# Patient Record
Sex: FEMALE | Race: OTHER | HISPANIC OR LATINO | Employment: FULL TIME | URBAN - METROPOLITAN AREA
[De-identification: names, ages, dates, MRNs, and addresses within clinical notes are randomized per-mention and may not be internally consistent; named-entity substitution may affect disease eponyms.]

---

## 2019-05-24 ENCOUNTER — HOSPITAL ENCOUNTER (EMERGENCY)
Facility: HOSPITAL | Age: 24
Discharge: HOME | End: 2019-05-24
Attending: EMERGENCY MEDICINE
Payer: COMMERCIAL

## 2019-05-24 VITALS
DIASTOLIC BLOOD PRESSURE: 68 MMHG | SYSTOLIC BLOOD PRESSURE: 145 MMHG | TEMPERATURE: 98.1 F | RESPIRATION RATE: 16 BRPM | WEIGHT: 143.08 LBS | HEART RATE: 79 BPM | OXYGEN SATURATION: 100 %

## 2019-05-24 DIAGNOSIS — R42 DIZZINESS: ICD-10-CM

## 2019-05-24 DIAGNOSIS — N64.4 BREAST PAIN IN FEMALE: Primary | ICD-10-CM

## 2019-05-24 LAB
BACTERIA UR QL AUTO: ABNORMAL /HPF
BILIRUB UR QL STRIP: NEGATIVE
CLARITY UR: ABNORMAL
COLOR UR: YELLOW
EXT PREG TEST URINE: NEGATIVE
GLUCOSE UR STRIP-MCNC: NEGATIVE MG/DL
HGB UR QL STRIP.AUTO: NEGATIVE
KETONES UR STRIP-MCNC: NEGATIVE MG/DL
LEUKOCYTE ESTERASE UR QL STRIP: ABNORMAL
NITRITE UR QL STRIP: NEGATIVE
NON-SQ EPI CELLS URNS QL MICRO: ABNORMAL /HPF
PH UR STRIP.AUTO: 7 [PH] (ref 4.5–8)
PROT UR STRIP-MCNC: NEGATIVE MG/DL
RBC #/AREA URNS AUTO: ABNORMAL /HPF
SP GR UR STRIP.AUTO: 1.02 (ref 1–1.03)
UROBILINOGEN UR QL STRIP.AUTO: 0.2 E.U./DL
WBC #/AREA URNS AUTO: ABNORMAL /HPF

## 2019-05-24 PROCEDURE — 99283 EMERGENCY DEPT VISIT LOW MDM: CPT

## 2019-05-24 PROCEDURE — 81025 URINE PREGNANCY TEST: CPT | Performed by: EMERGENCY MEDICINE

## 2019-05-24 PROCEDURE — 99283 EMERGENCY DEPT VISIT LOW MDM: CPT | Performed by: EMERGENCY MEDICINE

## 2019-05-24 PROCEDURE — 93005 ELECTROCARDIOGRAM TRACING: CPT

## 2019-05-24 PROCEDURE — 81001 URINALYSIS AUTO W/SCOPE: CPT

## 2019-05-24 RX ORDER — ACETAMINOPHEN 325 MG/1
650 TABLET ORAL ONCE
Status: COMPLETED | OUTPATIENT
Start: 2019-05-24 | End: 2019-05-24

## 2019-05-24 RX ADMIN — ACETAMINOPHEN 650 MG: 325 TABLET ORAL at 23:25

## 2019-05-25 LAB
ATRIAL RATE: 80 BPM
P AXIS: 39 DEGREES
PR INTERVAL: 134 MS
QRS AXIS: 83 DEGREES
QRSD INTERVAL: 98 MS
QT INTERVAL: 382 MS
QTC INTERVAL: 440 MS
T WAVE AXIS: 45 DEGREES
VENTRICULAR RATE: 80 BPM

## 2019-05-25 PROCEDURE — 93010 ELECTROCARDIOGRAM REPORT: CPT | Performed by: INTERNAL MEDICINE

## 2019-07-11 ENCOUNTER — ANNUAL EXAM (OUTPATIENT)
Dept: OBGYN CLINIC | Facility: CLINIC | Age: 24
End: 2019-07-11

## 2019-07-11 VITALS
SYSTOLIC BLOOD PRESSURE: 110 MMHG | HEIGHT: 63 IN | BODY MASS INDEX: 25.83 KG/M2 | DIASTOLIC BLOOD PRESSURE: 70 MMHG | WEIGHT: 145.8 LBS

## 2019-07-11 DIAGNOSIS — Z12.4 SCREENING FOR CERVICAL CANCER: Primary | ICD-10-CM

## 2019-07-11 DIAGNOSIS — Z12.39 SCREENING FOR BREAST CANCER: ICD-10-CM

## 2019-07-11 DIAGNOSIS — Z01.419 ENCOUNTER FOR GYNECOLOGICAL EXAMINATION WITHOUT ABNORMAL FINDING: ICD-10-CM

## 2019-07-11 DIAGNOSIS — Z11.3 SCREENING EXAMINATION FOR SEXUALLY TRANSMITTED DISEASE: ICD-10-CM

## 2019-07-11 PROCEDURE — 87491 CHLMYD TRACH DNA AMP PROBE: CPT | Performed by: NURSE PRACTITIONER

## 2019-07-11 PROCEDURE — G0145 SCR C/V CYTO,THINLAYER,RESCR: HCPCS | Performed by: NURSE PRACTITIONER

## 2019-07-11 PROCEDURE — 99385 PREV VISIT NEW AGE 18-39: CPT | Performed by: NURSE PRACTITIONER

## 2019-07-11 PROCEDURE — 87591 N.GONORRHOEAE DNA AMP PROB: CPT | Performed by: NURSE PRACTITIONER

## 2019-07-11 NOTE — PROGRESS NOTES
Kalen Pond is a 26 y o  female who presents today for annual GYN exam   She has never had a pap smear performed previously  She reports menses as regular  Patient's last menstrual period was 2019 (exact date)  Her contraceptive method is nothing currently  Her general medical history has been reviewed and she reports it as follows:    Past Medical History:   Diagnosis Date    Asthma     in early childhood    Prediabetes 2019    started tx with Metformin 2019     Past Surgical History:   Procedure Laterality Date    NO PAST SURGERIES       OB History        0    Para   0    Term   0       0    AB   0    Living   0       SAB   0    TAB   0    Ectopic   0    Multiple   0    Live Births   0               Social History     Tobacco Use    Smoking status: Never Smoker    Smokeless tobacco: Never Used   Substance Use Topics    Alcohol use: Yes     Frequency: Monthly or less     Drinks per session: 1 or 2    Drug use: Never     Cancer-related family history is negative for Breast cancer and Cancer  Current Outpatient Medications:     metFORMIN (GLUCOPHAGE) 500 mg tablet, Take 500 mg by mouth daily with breakfast, Disp: , Rfl:     Review of Systems:  Review of Systems   Constitutional: Negative  Gastrointestinal: Negative  Genitourinary: Negative for difficulty urinating, menstrual problem, pelvic pain and vaginal discharge  Skin: Negative  Physical Exam:  /70   Ht 5' 3" (1 6 m)   Wt 66 1 kg (145 lb 12 8 oz)   LMP 2019 (Exact Date)   BMI 25 83 kg/m²   Physical Exam   Constitutional: She is oriented to person, place, and time  She appears well-developed and well-nourished  Genitourinary: Vagina normal and uterus normal  There is no lesion on the right labia  There is no lesion on the left labia  Vagina exhibits no lesion and no rugosity  No vaginal discharge found   Right adnexum does not display mass and does not display tenderness  Left adnexum does not display mass and does not display tenderness  Cervix does not exhibit motion tenderness, lesion or pinkness  Uterus is not tender  Neck: Neck supple  No thyromegaly present  Cardiovascular: Normal rate and regular rhythm  Pulmonary/Chest: Effort normal and breath sounds normal  Right breast exhibits no mass, no nipple discharge, no skin change and no tenderness  Left breast exhibits no mass, no nipple discharge, no skin change and no tenderness  Abdominal: Soft  Bowel sounds are normal    Neurological: She is alert and oriented to person, place, and time  Skin: Skin is warm and dry  Assessment/Plan:   1  Normal well-woman GYN exam   2  Cervical cancer screening:  Pap smear done with HPV reflex  3  STD screening:  Orders placed for vaginal GC/CT cultures  4  Breast cancer screening:  Normal breast exam    5  Depression Screening: Patient's depression screening was positive with a PHQ-2 score of 0  No intervention indicated  6  BMI Counseling: Body mass index is 25 83 kg/m²  No intervention indicated at this time     7  Return to office in 1 year for annual GYN exam

## 2019-07-11 NOTE — LETTER
7/15/2019    To Valentine Alexander  : 1995      This letter is to advise you that your recent PAP SMEAR and CULTURE results were reviewed by me and are NORMAL    We will see you in 1 year for your annual exam     VIRAJ Esquivel

## 2019-07-12 LAB
C TRACH DNA SPEC QL NAA+PROBE: NEGATIVE
N GONORRHOEA DNA SPEC QL NAA+PROBE: NEGATIVE

## 2019-07-15 LAB
LAB AP GYN PRIMARY INTERPRETATION: NORMAL
Lab: NORMAL
PATH INTERP SPEC-IMP: NORMAL

## 2019-09-10 ENCOUNTER — HOSPITAL ENCOUNTER (EMERGENCY)
Facility: HOSPITAL | Age: 24
Discharge: HOME | End: 2019-09-10
Attending: EMERGENCY MEDICINE | Admitting: EMERGENCY MEDICINE
Payer: COMMERCIAL

## 2019-09-10 ENCOUNTER — APPOINTMENT (EMERGENCY)
Dept: ULTRASOUND IMAGING | Facility: HOSPITAL | Age: 24
End: 2019-09-10
Payer: COMMERCIAL

## 2019-09-10 VITALS
WEIGHT: 144.62 LBS | BODY MASS INDEX: 25.62 KG/M2 | HEART RATE: 87 BPM | RESPIRATION RATE: 18 BRPM | SYSTOLIC BLOOD PRESSURE: 107 MMHG | TEMPERATURE: 99 F | DIASTOLIC BLOOD PRESSURE: 64 MMHG | OXYGEN SATURATION: 100 %

## 2019-09-10 DIAGNOSIS — R10.9 ABDOMINAL PAIN: Primary | ICD-10-CM

## 2019-09-10 DIAGNOSIS — N39.0 UTI (URINARY TRACT INFECTION): ICD-10-CM

## 2019-09-10 DIAGNOSIS — N94.9 ADNEXAL CYST: ICD-10-CM

## 2019-09-10 LAB
ANION GAP SERPL CALCULATED.3IONS-SCNC: 9 MMOL/L (ref 4–13)
BACTERIA UR QL AUTO: ABNORMAL /HPF
BASOPHILS # BLD AUTO: 0.02 THOUSANDS/ΜL (ref 0–0.1)
BASOPHILS NFR BLD AUTO: 0 % (ref 0–1)
BILIRUB UR QL STRIP: NEGATIVE
BUN SERPL-MCNC: 11 MG/DL (ref 5–25)
CALCIUM SERPL-MCNC: 8.9 MG/DL (ref 8.3–10.1)
CHLORIDE SERPL-SCNC: 103 MMOL/L (ref 100–108)
CLARITY UR: CLEAR
CO2 SERPL-SCNC: 26 MMOL/L (ref 21–32)
COLOR UR: YELLOW
CREAT SERPL-MCNC: 0.77 MG/DL (ref 0.6–1.3)
EOSINOPHIL # BLD AUTO: 0.09 THOUSAND/ΜL (ref 0–0.61)
EOSINOPHIL NFR BLD AUTO: 1 % (ref 0–6)
ERYTHROCYTE [DISTWIDTH] IN BLOOD BY AUTOMATED COUNT: 13.2 % (ref 11.6–15.1)
EXT PREG TEST URINE: NEGATIVE
EXT. CONTROL ED NAV: NORMAL
GFR SERPL CREATININE-BSD FRML MDRD: 110 ML/MIN/1.73SQ M
GLUCOSE SERPL-MCNC: 100 MG/DL (ref 65–140)
GLUCOSE UR STRIP-MCNC: NEGATIVE MG/DL
HCT VFR BLD AUTO: 30.6 % (ref 34.8–46.1)
HGB BLD-MCNC: 9.8 G/DL (ref 11.5–15.4)
HGB UR QL STRIP.AUTO: NEGATIVE
IMM GRANULOCYTES # BLD AUTO: 0.05 THOUSAND/UL (ref 0–0.2)
IMM GRANULOCYTES NFR BLD AUTO: 1 % (ref 0–2)
KETONES UR STRIP-MCNC: NEGATIVE MG/DL
LEUKOCYTE ESTERASE UR QL STRIP: ABNORMAL
LYMPHOCYTES # BLD AUTO: 3.04 THOUSANDS/ΜL (ref 0.6–4.47)
LYMPHOCYTES NFR BLD AUTO: 28 % (ref 14–44)
MCH RBC QN AUTO: 27.1 PG (ref 26.8–34.3)
MCHC RBC AUTO-ENTMCNC: 32 G/DL (ref 31.4–37.4)
MCV RBC AUTO: 85 FL (ref 82–98)
MONOCYTES # BLD AUTO: 0.66 THOUSAND/ΜL (ref 0.17–1.22)
MONOCYTES NFR BLD AUTO: 6 % (ref 4–12)
NEUTROPHILS # BLD AUTO: 6.92 THOUSANDS/ΜL (ref 1.85–7.62)
NEUTS SEG NFR BLD AUTO: 64 % (ref 43–75)
NITRITE UR QL STRIP: NEGATIVE
NON-SQ EPI CELLS URNS QL MICRO: ABNORMAL /HPF
NRBC BLD AUTO-RTO: 0 /100 WBCS
PH UR STRIP.AUTO: 7.5 [PH] (ref 4.5–8)
PLATELET # BLD AUTO: 255 THOUSANDS/UL (ref 149–390)
PMV BLD AUTO: 9.8 FL (ref 8.9–12.7)
POTASSIUM SERPL-SCNC: 3.2 MMOL/L (ref 3.5–5.3)
PROT UR STRIP-MCNC: NEGATIVE MG/DL
RBC # BLD AUTO: 3.62 MILLION/UL (ref 3.81–5.12)
RBC #/AREA URNS AUTO: ABNORMAL /HPF
SODIUM SERPL-SCNC: 138 MMOL/L (ref 136–145)
SP GR UR STRIP.AUTO: 1.02 (ref 1–1.03)
UROBILINOGEN UR QL STRIP.AUTO: 0.2 E.U./DL
WBC # BLD AUTO: 10.78 THOUSAND/UL (ref 4.31–10.16)
WBC #/AREA URNS AUTO: ABNORMAL /HPF

## 2019-09-10 PROCEDURE — 81025 URINE PREGNANCY TEST: CPT | Performed by: EMERGENCY MEDICINE

## 2019-09-10 PROCEDURE — 99284 EMERGENCY DEPT VISIT MOD MDM: CPT | Performed by: EMERGENCY MEDICINE

## 2019-09-10 PROCEDURE — 36415 COLL VENOUS BLD VENIPUNCTURE: CPT | Performed by: EMERGENCY MEDICINE

## 2019-09-10 PROCEDURE — 80048 BASIC METABOLIC PNL TOTAL CA: CPT | Performed by: EMERGENCY MEDICINE

## 2019-09-10 PROCEDURE — 76830 TRANSVAGINAL US NON-OB: CPT

## 2019-09-10 PROCEDURE — 76856 US EXAM PELVIC COMPLETE: CPT

## 2019-09-10 PROCEDURE — 99284 EMERGENCY DEPT VISIT MOD MDM: CPT

## 2019-09-10 PROCEDURE — 81001 URINALYSIS AUTO W/SCOPE: CPT

## 2019-09-10 PROCEDURE — 85025 COMPLETE CBC W/AUTO DIFF WBC: CPT | Performed by: EMERGENCY MEDICINE

## 2019-09-10 RX ORDER — NITROFURANTOIN 25; 75 MG/1; MG/1
100 CAPSULE ORAL 2 TIMES DAILY
Qty: 10 CAPSULE | Refills: 0 | Status: SHIPPED | OUTPATIENT
Start: 2019-09-10 | End: 2020-05-13

## 2019-09-10 NOTE — DISCHARGE INSTRUCTIONS
FINDINGS:     UTERUS:  The uterus is anteverted in position, measuring 7 7 x 4 6 x 5 7 cm  Contour and echotexture appear normal   The cervix shows no suspicious abnormality  ENDOMETRIUM:    Normal caliber of 16 mm  Homogeneous and normal in appearance  OVARIES/ADNEXA:  Right ovary is normal size, 10 cc  No suspicious right ovarian abnormality  Doppler flow within normal limits  Left ovary is slightly enlarged, 23 cc  Left ovarian 4 4 x 1 9 x 4 2 cm simple cyst   Doppler flow within normal limits  Large right adnexal 8 1 x 7 2 x 6 1 cm cyst which appears simple adjacent to the right ovary  There is small amount of simple free fluid in the pelvis, likely physiologic in this premenopausal female  IMPRESSION:        Large right adnexal/paraovarian 8 1 x 7 2 x 6 1 cm simple cyst (previously 6 2 x 6 5 x 4 5 cm) which appears simple adjacent to the right ovary  Follow-up nonemergent outpatient contrast enhanced pelvic MRI recommended

## 2019-09-10 NOTE — ED PROVIDER NOTES
History  Chief Complaint   Patient presents with    Abdominal Pain      used - Reports pelvic and abdominal pain x2 days  Denies n/v or urinary sx  Subjective fevers at home  This is an otherwise healthy 53-year-old female who presents with abdominal pain  The patient is Bulgarian-speaking only so the  line was used  Over the past 2 days, the patient has been experiencing bilateral "ovary pain"  The patient states that she has a history of cysts and "they are bothering me"  She describes the pain as a constant pressure in the bilateral pelvic area, nonradiating, associated with dysuria  She has tried taking Motrin without relief  Denies any exacerbating factors  The patient is sexually active and uses protection 100% of the time  Denies any history of STDs  Denies any history of abdominal surgeries  No history of gallstones, kidney stones, alcohol abuse  Denies fever/chills, nausea/vomiting, lightheadedness/dizziness, numbness/weakness, headache, change in vision, URI symptoms, neck pain, chest pain, palpitations, shortness of breath, cough, back pain, flank pain, abdominal pain, diarrhea, hematochezia, melena, hematuria, abnormal vaginal discharge/bleeding  5:18 PM  I reviewed all testing with the patient using the  phone      I have reviewed all incidental findings including the results of her transvaginal US:  "Large right adnexal/paraovarian 8 1 x 7 2 x 6 1 cm simple cyst (previously 6 2 x 6 5 x 4 5 cm) which appears simple adjacent to the right ovary  Follow-up nonemergent outpatient contrast enhanced pelvic MRI recommended ", as well her new onset anemia  Patient instructed to follow up with primary care doctor regarding these findings  I gave oral return precautions for what to return for in addition to the written return precautions     The patient (and any family present) verbalized understanding of the discharge instructions and warnings that would necessitate return to the Emergency Department  I specifically highlighted areas of special concern regarding the written and verbal discharge instructions and return precautions  All questions were answered prior to discharge  Patient is asymptomatic as far as the anemia  Denies any recent bleeding or menstrual bleeding  The patient states that she does have a family doctor that she follows with  States that she will call her family doctor for follow-up  Prior to Admission Medications   Prescriptions Last Dose Informant Patient Reported? Taking?   metFORMIN (GLUCOPHAGE) 500 mg tablet  Self Yes No   Sig: Take 500 mg by mouth daily with breakfast      Facility-Administered Medications: None       Past Medical History:   Diagnosis Date    Asthma     in early childhood    Prediabetes 2019    started tx with Metformin 7/2019       Past Surgical History:   Procedure Laterality Date    NO PAST SURGERIES         Family History   Problem Relation Age of Onset    Breast cancer Neg Hx     Cancer Neg Hx      I have reviewed and agree with the history as documented  Social History     Tobacco Use    Smoking status: Never Smoker    Smokeless tobacco: Never Used   Substance Use Topics    Alcohol use: Yes     Frequency: Monthly or less     Drinks per session: 1 or 2    Drug use: Never        Review of Systems   Constitutional: Negative for chills and fever  HENT: Negative for rhinorrhea, sore throat and trouble swallowing  Eyes: Negative for photophobia and visual disturbance  Respiratory: Negative for cough, chest tightness and shortness of breath  Cardiovascular: Negative for chest pain, palpitations and leg swelling  Gastrointestinal: Negative for abdominal pain, blood in stool, diarrhea, nausea and vomiting  Endocrine: Negative for polyuria  Genitourinary: Positive for dysuria and pelvic pain  Negative for flank pain, hematuria, vaginal bleeding and vaginal discharge  Musculoskeletal: Negative for back pain and neck pain  Skin: Negative for color change and rash  Allergic/Immunologic: Negative for immunocompromised state  Neurological: Negative for dizziness, weakness, light-headedness, numbness and headaches  All other systems reviewed and are negative  Physical Exam  Physical Exam   Constitutional: Vital signs are normal  She appears well-developed  She is cooperative  No distress  HENT:   Mouth/Throat: Uvula is midline, oropharynx is clear and moist and mucous membranes are normal    Eyes: Pupils are equal, round, and reactive to light  Conjunctivae and EOM are normal    Neck: Trachea normal  No thyroid mass and no thyromegaly present  Cardiovascular: Normal rate, regular rhythm, normal heart sounds, intact distal pulses and normal pulses  No murmur heard  Pulmonary/Chest: Effort normal and breath sounds normal    Abdominal: Soft  Normal appearance and bowel sounds are normal  There is tenderness in the right lower quadrant and left lower quadrant  There is no rebound, no guarding and no CVA tenderness  The patient has bilateral tenderness within the pelvis  No rebound or guarding  Neurological: She is alert  Skin: Skin is warm, dry and intact  Psychiatric: She has a normal mood and affect   Her speech is normal and behavior is normal  Thought content normal        Vital Signs  ED Triage Vitals   Temperature Pulse Respirations Blood Pressure SpO2   09/10/19 1443 09/10/19 1443 09/10/19 1443 09/10/19 1443 09/10/19 1443   99 °F (37 2 °C) 86 20 123/65 100 %      Temp Source Heart Rate Source Patient Position - Orthostatic VS BP Location FiO2 (%)   09/10/19 1443 09/10/19 1443 09/10/19 1443 09/10/19 1443 --   Oral Monitor Sitting Right arm       Pain Score       09/10/19 1650       3           Vitals:    09/10/19 1443 09/10/19 1650   BP: 123/65 107/64   Pulse: 86 87   Patient Position - Orthostatic VS: Sitting Sitting         Visual Acuity      ED Medications  Medications - No data to display    Diagnostic Studies  Results Reviewed     Procedure Component Value Units Date/Time    Urine Microscopic [[id number]]  (Abnormal) Collected:  09/10/19 1508    Lab Status:  Final result Specimen:  Urine, Clean Catch Updated:  09/10/19 1538     RBC, UA None Seen /hpf      WBC, UA 2-4 /hpf      Epithelial Cells Moderate /hpf      Bacteria, UA Occasional /hpf     Basic metabolic panel [[id number]]  (Abnormal) Collected:  09/10/19 1518    Lab Status:  Final result Specimen:  Blood from Arm, Right Updated:  09/10/19 1533     Sodium 138 mmol/L      Potassium 3 2 mmol/L      Chloride 103 mmol/L      CO2 26 mmol/L      ANION GAP 9 mmol/L      BUN 11 mg/dL      Creatinine 0 77 mg/dL      Glucose 100 mg/dL      Calcium 8 9 mg/dL      eGFR 110 ml/min/1 73sq m     Narrative:       [place] guidelines for Chronic Kidney Disease (CKD):     Stage 1 with normal or high GFR (GFR > 90 mL/min/1 73 square meters)    Stage 2 Mild CKD (GFR = 60-89 mL/min/1 73 square meters)    Stage 3A Moderate CKD (GFR = 45-59 mL/min/1 73 square meters)    Stage 3B Moderate CKD (GFR = 30-44 mL/min/1 73 square meters)    Stage 4 Severe CKD (GFR = 15-29 mL/min/1 73 square meters)    Stage 5 End Stage CKD (GFR <15 mL/min/1 73 square meters)  Note: GFR calculation is accurate only with a steady state creatinine    CBC and differential [[id number]]  (Abnormal) Collected:  09/10/19 1518    Lab Status:  Final result Specimen:  Blood from Arm, Right Updated:  09/10/19 1524     WBC 10 78 Thousand/uL      RBC 3 62 Million/uL      Hemoglobin 9 8 g/dL      Hematocrit 30 6 %      MCV 85 fL      MCH 27 1 pg      MCHC 32 0 g/dL      RDW 13 2 %      MPV 9 8 fL      Platelets 686 Thousands/uL      nRBC 0 /100 WBCs      Neutrophils Relative 64 %      Immat GRANS % 1 %      Lymphocytes Relative 28 %      Monocytes Relative 6 %      Eosinophils Relative 1 %      Basophils Relative 0 % Neutrophils Absolute 6 92 Thousands/µL      Immature Grans Absolute 0 05 Thousand/uL      Lymphocytes Absolute 3 04 Thousands/µL      Monocytes Absolute 0 66 Thousand/µL      Eosinophils Absolute 0 09 Thousand/µL      Basophils Absolute 0 02 Thousands/µL     POCT pregnancy, urine [[id number]]  (Normal) Resulted:  09/10/19 1510    Lab Status:  Final result Updated:  09/10/19 1510     EXT PREG TEST UR (Ref: Negative) Negative     Control Valid    POCT urinalysis dipstick [[id number]]  (Abnormal) Resulted:  09/10/19 1510    Lab Status:  Final result Specimen:  Urine Updated:  09/10/19 1510    ED Urine Macroscopic [[id number]]  (Abnormal) Collected:  09/10/19 1508    Lab Status:  Final result Specimen:  Urine Updated:  09/10/19 1509     Color, UA Yellow     Clarity, UA Clear     pH, UA 7 5     Leukocytes, UA Trace     Nitrite, UA Negative     Protein, UA Negative mg/dl      Glucose, UA Negative mg/dl      Ketones, UA Negative mg/dl      Urobilinogen, UA 0 2 E U /dl      Bilirubin, UA Negative     Blood, UA Negative     Specific Gravity, UA 1 020    Narrative:       CLINITEK RESULT                 US pelvis complete w transvaginal   Final Result by Juani Kline MD (09/10 1659)          Large right adnexal/paraovarian 8 1 x 7 2 x 6 1 cm simple cyst (previously 6 2 x 6 5 x 4 5 cm) which appears simple adjacent to the right ovary  Follow-up nonemergent outpatient contrast enhanced pelvic MRI recommended  Workstation performed: [id number]                    Procedures  Procedures       ED Course  ED Course as of Sep 10 1725   Tue Sep 10, 2019   1525 Hemoglobin(!): 9 8                               MDM  Number of Diagnoses or Management Options  Diagnosis management comments: Likely ovarian cysts verses UTI versus torsion  Will check basic lab work with urine pregnancy and urinalysis  Pelvic ultrasound  Disposition pending results        Disposition  Final diagnoses:   Abdominal pain   Adnexal cyst   UTI (urinary tract infection)     Time reflects when diagnosis was documented in both MDM as applicable and the Disposition within this note     Time User Action Codes Description Comment    9/10/2019  5:21 PM Tone Pennington Add [R10 9] Abdominal pain     9/10/2019  5:21 PM Tone Pennington Add [N94 9] Adnexal cyst     9/10/2019  5:22 PM Wilner HINES Add [N39 0] UTI (urinary tract infection)       ED Disposition     ED Disposition Condition Date/Time Comment    Discharge Stable Tue Sep 10, 2019  5:21 PM Leyva Yobani discharge to home/self care  Follow-up Information     Follow up With Specialties Details Why Contact Info Additional Information    [facility] Emergency Medicine Go to   [facility] [address] [phone] [place] [facility], [address] [place], [place], [address] [place] [address] doctor de tom  Schedule an appointment as soon as possible for a visit              Patient's Medications   Discharge Prescriptions    NITROFURANTOIN (MACROBID) 100 MG CAPSULE    Take 1 capsule (100 mg total) by mouth 2 (two) times a day       Start Date: 9/10/2019 End Date: --       Order Dose: 100 mg       Quantity: 10 capsule    Refills: 0     No discharge procedures on file      ED Provider  Electronically Signed by           Darryle Parkins, MD  09/10/19 6099

## 2020-01-14 LAB — EXTERNAL HIV SCREEN: NORMAL

## 2020-05-13 ENCOUNTER — APPOINTMENT (EMERGENCY)
Dept: RADIOLOGY | Facility: HOSPITAL | Age: 25
End: 2020-05-13
Payer: COMMERCIAL

## 2020-05-13 ENCOUNTER — HOSPITAL ENCOUNTER (EMERGENCY)
Facility: HOSPITAL | Age: 25
Discharge: HOME | End: 2020-05-13
Attending: EMERGENCY MEDICINE | Admitting: EMERGENCY MEDICINE
Payer: COMMERCIAL

## 2020-05-13 VITALS
DIASTOLIC BLOOD PRESSURE: 70 MMHG | BODY MASS INDEX: 25.77 KG/M2 | HEART RATE: 75 BPM | OXYGEN SATURATION: 98 % | TEMPERATURE: 98.4 F | SYSTOLIC BLOOD PRESSURE: 121 MMHG | RESPIRATION RATE: 16 BRPM | WEIGHT: 145.5 LBS

## 2020-05-13 DIAGNOSIS — R05.9 COUGH: Primary | ICD-10-CM

## 2020-05-13 DIAGNOSIS — R06.00 DYSPNEA: ICD-10-CM

## 2020-05-13 LAB
ATRIAL RATE: 91 BPM
P AXIS: 42 DEGREES
PR INTERVAL: 132 MS
QRS AXIS: 80 DEGREES
QRSD INTERVAL: 92 MS
QT INTERVAL: 334 MS
QTC INTERVAL: 410 MS
T WAVE AXIS: 44 DEGREES
VENTRICULAR RATE: 91 BPM

## 2020-05-13 PROCEDURE — 93010 ELECTROCARDIOGRAM REPORT: CPT | Performed by: INTERNAL MEDICINE

## 2020-05-13 PROCEDURE — 99283 EMERGENCY DEPT VISIT LOW MDM: CPT

## 2020-05-13 PROCEDURE — 71045 X-RAY EXAM CHEST 1 VIEW: CPT

## 2020-05-13 PROCEDURE — 99285 EMERGENCY DEPT VISIT HI MDM: CPT | Performed by: EMERGENCY MEDICINE

## 2020-05-13 PROCEDURE — 94640 AIRWAY INHALATION TREATMENT: CPT

## 2020-05-13 PROCEDURE — 93005 ELECTROCARDIOGRAM TRACING: CPT

## 2020-05-13 RX ORDER — ACETAMINOPHEN 325 MG/1
650 TABLET ORAL EVERY 6 HOURS PRN
COMMUNITY

## 2020-05-13 RX ORDER — AZITHROMYCIN 250 MG/1
TABLET, FILM COATED ORAL
Qty: 6 TABLET | Refills: 0 | Status: SHIPPED | OUTPATIENT
Start: 2020-05-13 | End: 2020-05-17

## 2020-05-13 RX ORDER — PREDNISONE 20 MG/1
40 TABLET ORAL DAILY
Qty: 10 TABLET | Refills: 0 | Status: SHIPPED | OUTPATIENT
Start: 2020-05-13 | End: 2020-05-18

## 2020-05-13 RX ADMIN — IPRATROPIUM BROMIDE 0.5 MG: 0.5 SOLUTION RESPIRATORY (INHALATION) at 12:43

## 2020-05-13 RX ADMIN — ALBUTEROL SULFATE 5 MG: 2.5 SOLUTION RESPIRATORY (INHALATION) at 12:43

## 2020-05-14 ENCOUNTER — TELEPHONE (OUTPATIENT)
Dept: ADMINISTRATIVE | Facility: OTHER | Age: 25
End: 2020-05-14

## 2020-05-14 RX ORDER — ALBUTEROL SULFATE 90 UG/1
1 AEROSOL, METERED RESPIRATORY (INHALATION) EVERY 6 HOURS PRN
COMMUNITY
Start: 2020-04-07 | End: 2021-04-07

## 2020-05-14 RX ORDER — LEVONORGESTREL AND ETHINYL ESTRADIOL 0.1-0.02MG
20 KIT ORAL DAILY
COMMUNITY
Start: 2020-04-20 | End: 2021-09-03

## 2020-05-15 ENCOUNTER — TELEMEDICINE (OUTPATIENT)
Dept: FAMILY MEDICINE CLINIC | Facility: CLINIC | Age: 25
End: 2020-05-15
Payer: COMMERCIAL

## 2020-05-15 DIAGNOSIS — B97.21 SARS-ASSOCIATED CORONAVIRUS INFECTION: Primary | ICD-10-CM

## 2020-05-15 PROCEDURE — 99213 OFFICE O/P EST LOW 20 MIN: CPT | Performed by: NURSE PRACTITIONER

## 2020-05-18 ENCOUNTER — TELEMEDICINE (OUTPATIENT)
Dept: FAMILY MEDICINE CLINIC | Facility: CLINIC | Age: 25
End: 2020-05-18
Payer: COMMERCIAL

## 2020-05-18 DIAGNOSIS — B97.21 SARS-ASSOCIATED CORONAVIRUS INFECTION: ICD-10-CM

## 2020-05-18 DIAGNOSIS — H10.13 ALLERGIC CONJUNCTIVITIS OF BOTH EYES: Primary | ICD-10-CM

## 2020-05-18 PROCEDURE — 99213 OFFICE O/P EST LOW 20 MIN: CPT | Performed by: NURSE PRACTITIONER

## 2020-05-18 RX ORDER — AZELASTINE HYDROCHLORIDE 0.5 MG/ML
1 SOLUTION/ DROPS OPHTHALMIC 2 TIMES DAILY
Qty: 6 ML | Refills: 0 | Status: SHIPPED | OUTPATIENT
Start: 2020-05-18 | End: 2021-09-15

## 2020-05-19 ENCOUNTER — TELEPHONE (OUTPATIENT)
Dept: PULMONOLOGY | Facility: CLINIC | Age: 25
End: 2020-05-19

## 2020-05-19 ENCOUNTER — OFFICE VISIT (OUTPATIENT)
Dept: INTERNAL MEDICINE CLINIC | Facility: CLINIC | Age: 25
End: 2020-05-19
Payer: COMMERCIAL

## 2020-05-19 VITALS — HEART RATE: 132 BPM | TEMPERATURE: 96.1 F | OXYGEN SATURATION: 99 %

## 2020-05-19 DIAGNOSIS — B97.21 SARS-ASSOCIATED CORONAVIRUS INFECTION: Primary | ICD-10-CM

## 2020-05-19 PROCEDURE — 1036F TOBACCO NON-USER: CPT | Performed by: NURSE PRACTITIONER

## 2020-05-19 PROCEDURE — 99214 OFFICE O/P EST MOD 30 MIN: CPT | Performed by: NURSE PRACTITIONER

## 2020-05-20 ENCOUNTER — TELEPHONE (OUTPATIENT)
Dept: FAMILY MEDICINE CLINIC | Facility: CLINIC | Age: 25
End: 2020-05-20

## 2020-05-21 ENCOUNTER — TELEMEDICINE (OUTPATIENT)
Dept: FAMILY MEDICINE CLINIC | Facility: CLINIC | Age: 25
End: 2020-05-21
Payer: COMMERCIAL

## 2020-05-21 DIAGNOSIS — J45.40 MODERATE PERSISTENT ASTHMA WITHOUT COMPLICATION: Primary | ICD-10-CM

## 2020-05-21 PROCEDURE — 99213 OFFICE O/P EST LOW 20 MIN: CPT | Performed by: NURSE PRACTITIONER

## 2020-05-21 RX ORDER — FLUTICASONE PROPIONATE 44 UG/1
2 AEROSOL, METERED RESPIRATORY (INHALATION) 2 TIMES DAILY
Qty: 1 INHALER | Refills: 3 | Status: SHIPPED | OUTPATIENT
Start: 2020-05-21 | End: 2020-05-27 | Stop reason: ALTCHOICE

## 2020-05-22 ENCOUNTER — TELEPHONE (OUTPATIENT)
Dept: FAMILY MEDICINE CLINIC | Facility: CLINIC | Age: 25
End: 2020-05-22

## 2020-05-26 ENCOUNTER — TELEMEDICINE (OUTPATIENT)
Dept: FAMILY MEDICINE CLINIC | Facility: CLINIC | Age: 25
End: 2020-05-26
Payer: COMMERCIAL

## 2020-05-26 DIAGNOSIS — B97.21 SARS-ASSOCIATED CORONAVIRUS INFECTION: Primary | ICD-10-CM

## 2020-05-26 PROCEDURE — 99213 OFFICE O/P EST LOW 20 MIN: CPT | Performed by: NURSE PRACTITIONER

## 2020-05-27 ENCOUNTER — DOCUMENTATION (OUTPATIENT)
Dept: PULMONOLOGY | Facility: CLINIC | Age: 25
End: 2020-05-27

## 2020-05-27 ENCOUNTER — TELEMEDICINE (OUTPATIENT)
Dept: PULMONOLOGY | Facility: CLINIC | Age: 25
End: 2020-05-27
Payer: COMMERCIAL

## 2020-05-27 VITALS — WEIGHT: 145 LBS | HEIGHT: 62 IN | BODY MASS INDEX: 26.68 KG/M2

## 2020-05-27 DIAGNOSIS — J45.40 MODERATE PERSISTENT ASTHMA WITHOUT COMPLICATION: Primary | ICD-10-CM

## 2020-05-27 DIAGNOSIS — B97.21 SARS-ASSOCIATED CORONAVIRUS INFECTION: ICD-10-CM

## 2020-05-27 DIAGNOSIS — J45.909 ASTHMA DUE TO ENVIRONMENTAL ALLERGIES: ICD-10-CM

## 2020-05-27 PROCEDURE — 99203 OFFICE O/P NEW LOW 30 MIN: CPT | Performed by: INTERNAL MEDICINE

## 2020-05-27 RX ORDER — MONTELUKAST SODIUM 10 MG/1
10 TABLET ORAL
Qty: 90 TABLET | Refills: 3 | Status: SHIPPED | OUTPATIENT
Start: 2020-05-27 | End: 2021-08-06 | Stop reason: SDUPTHER

## 2020-05-27 RX ORDER — FLUTICASONE PROPIONATE 110 UG/1
2 AEROSOL, METERED RESPIRATORY (INHALATION) 2 TIMES DAILY
Qty: 1 INHALER | Refills: 3 | Status: SHIPPED | OUTPATIENT
Start: 2020-05-27 | End: 2021-08-06 | Stop reason: SDUPTHER

## 2020-06-02 ENCOUNTER — TELEPHONE (OUTPATIENT)
Dept: FAMILY MEDICINE CLINIC | Facility: CLINIC | Age: 25
End: 2020-06-02

## 2020-06-11 ENCOUNTER — TELEPHONE (OUTPATIENT)
Dept: FAMILY MEDICINE CLINIC | Facility: CLINIC | Age: 25
End: 2020-06-11

## 2020-06-16 ENCOUNTER — TELEPHONE (OUTPATIENT)
Dept: FAMILY MEDICINE CLINIC | Facility: CLINIC | Age: 25
End: 2020-06-16

## 2020-07-06 ENCOUNTER — APPOINTMENT (OUTPATIENT)
Dept: LAB | Facility: CLINIC | Age: 25
End: 2020-07-06
Payer: COMMERCIAL

## 2020-07-06 ENCOUNTER — OFFICE VISIT (OUTPATIENT)
Dept: FAMILY MEDICINE CLINIC | Facility: CLINIC | Age: 25
End: 2020-07-06
Payer: COMMERCIAL

## 2020-07-06 VITALS
BODY MASS INDEX: 27.31 KG/M2 | WEIGHT: 148.4 LBS | SYSTOLIC BLOOD PRESSURE: 115 MMHG | TEMPERATURE: 97.5 F | OXYGEN SATURATION: 97 % | HEART RATE: 93 BPM | DIASTOLIC BLOOD PRESSURE: 70 MMHG | HEIGHT: 62 IN | RESPIRATION RATE: 20 BRPM

## 2020-07-06 DIAGNOSIS — Z00.00 ANNUAL PHYSICAL EXAM: Primary | ICD-10-CM

## 2020-07-06 DIAGNOSIS — Z13.220 SCREENING FOR CHOLESTEROL LEVEL: ICD-10-CM

## 2020-07-06 DIAGNOSIS — R53.83 OTHER FATIGUE: ICD-10-CM

## 2020-07-06 DIAGNOSIS — R73.9 HYPERGLYCEMIA: ICD-10-CM

## 2020-07-06 LAB
ALBUMIN SERPL BCP-MCNC: 4 G/DL (ref 3.5–5)
ALP SERPL-CCNC: 56 U/L (ref 46–116)
ALT SERPL W P-5'-P-CCNC: 71 U/L (ref 12–78)
ANION GAP SERPL CALCULATED.3IONS-SCNC: 6 MMOL/L (ref 4–13)
AST SERPL W P-5'-P-CCNC: 42 U/L (ref 5–45)
BASOPHILS # BLD AUTO: 0.04 THOUSANDS/ΜL (ref 0–0.1)
BASOPHILS NFR BLD AUTO: 1 % (ref 0–1)
BILIRUB SERPL-MCNC: 0.25 MG/DL (ref 0.2–1)
BUN SERPL-MCNC: 16 MG/DL (ref 5–25)
CALCIUM SERPL-MCNC: 8.8 MG/DL (ref 8.3–10.1)
CHLORIDE SERPL-SCNC: 107 MMOL/L (ref 100–108)
CHOLEST SERPL-MCNC: 120 MG/DL (ref 50–200)
CO2 SERPL-SCNC: 26 MMOL/L (ref 21–32)
CREAT SERPL-MCNC: 0.74 MG/DL (ref 0.6–1.3)
EOSINOPHIL # BLD AUTO: 0.15 THOUSAND/ΜL (ref 0–0.61)
EOSINOPHIL NFR BLD AUTO: 2 % (ref 0–6)
ERYTHROCYTE [DISTWIDTH] IN BLOOD BY AUTOMATED COUNT: 12.4 % (ref 11.6–15.1)
EST. AVERAGE GLUCOSE BLD GHB EST-MCNC: 117 MG/DL
GFR SERPL CREATININE-BSD FRML MDRD: 115 ML/MIN/1.73SQ M
GLUCOSE P FAST SERPL-MCNC: 95 MG/DL (ref 65–99)
HBA1C MFR BLD: 5.7 %
HCT VFR BLD AUTO: 37.1 % (ref 34.8–46.1)
HDLC SERPL-MCNC: 31 MG/DL
HGB BLD-MCNC: 12 G/DL (ref 11.5–15.4)
IMM GRANULOCYTES # BLD AUTO: 0.02 THOUSAND/UL (ref 0–0.2)
IMM GRANULOCYTES NFR BLD AUTO: 0 % (ref 0–2)
LDLC SERPL CALC-MCNC: 63 MG/DL (ref 0–100)
LYMPHOCYTES # BLD AUTO: 2.5 THOUSANDS/ΜL (ref 0.6–4.47)
LYMPHOCYTES NFR BLD AUTO: 39 % (ref 14–44)
MCH RBC QN AUTO: 27 PG (ref 26.8–34.3)
MCHC RBC AUTO-ENTMCNC: 32.3 G/DL (ref 31.4–37.4)
MCV RBC AUTO: 83 FL (ref 82–98)
MONOCYTES # BLD AUTO: 0.39 THOUSAND/ΜL (ref 0.17–1.22)
MONOCYTES NFR BLD AUTO: 6 % (ref 4–12)
NEUTROPHILS # BLD AUTO: 3.28 THOUSANDS/ΜL (ref 1.85–7.62)
NEUTS SEG NFR BLD AUTO: 52 % (ref 43–75)
NONHDLC SERPL-MCNC: 89 MG/DL
NRBC BLD AUTO-RTO: 0 /100 WBCS
PLATELET # BLD AUTO: 262 THOUSANDS/UL (ref 149–390)
PMV BLD AUTO: 11.3 FL (ref 8.9–12.7)
POTASSIUM SERPL-SCNC: 3.8 MMOL/L (ref 3.5–5.3)
PROT SERPL-MCNC: 7.5 G/DL (ref 6.4–8.2)
RBC # BLD AUTO: 4.45 MILLION/UL (ref 3.81–5.12)
SODIUM SERPL-SCNC: 139 MMOL/L (ref 136–145)
TRIGL SERPL-MCNC: 128 MG/DL
TSH SERPL DL<=0.05 MIU/L-ACNC: 3.08 UIU/ML (ref 0.36–3.74)
WBC # BLD AUTO: 6.38 THOUSAND/UL (ref 4.31–10.16)

## 2020-07-06 PROCEDURE — 80053 COMPREHEN METABOLIC PANEL: CPT

## 2020-07-06 PROCEDURE — 85025 COMPLETE CBC W/AUTO DIFF WBC: CPT

## 2020-07-06 PROCEDURE — 80061 LIPID PANEL: CPT

## 2020-07-06 PROCEDURE — 83036 HEMOGLOBIN GLYCOSYLATED A1C: CPT

## 2020-07-06 PROCEDURE — 3008F BODY MASS INDEX DOCD: CPT | Performed by: NURSE PRACTITIONER

## 2020-07-06 PROCEDURE — 84443 ASSAY THYROID STIM HORMONE: CPT

## 2020-07-06 PROCEDURE — 36415 COLL VENOUS BLD VENIPUNCTURE: CPT

## 2020-07-06 PROCEDURE — 99395 PREV VISIT EST AGE 18-39: CPT | Performed by: NURSE PRACTITIONER

## 2020-07-06 NOTE — ASSESSMENT & PLAN NOTE
-Patient recommended healthy eating habits  Health risk assessment was discussed with patient also and the ways to stay healthier  Recommended a exercising frequently at least 5 days a week for 30 minutes at a time; such as joining a gym if not already enrolled or walking  Eating low-fat and low-cholesterol foods with avoidance of saturated fats or fried foods  Immunizations, and the need to comply with current CDC's recommendations were discussed  To follow up yearly for physical exams

## 2020-07-06 NOTE — PATIENT INSTRUCTIONS

## 2020-07-06 NOTE — PROGRESS NOTES
ADULT ANNUAL [address] [facility]    NAME: Tasha Ware  AGE: 27 y o  SEX: female  : 1995     DATE: 2020     Assessment and Plan:     Problem List Items Addressed This Visit        Other    Annual physical exam - Primary     -Patient recommended healthy eating habits  Health risk assessment was discussed with patient also and the ways to stay healthier  Recommended a exercising frequently at least 5 days a week for 30 minutes at a time; such as joining a gym if not already enrolled or walking  Eating low-fat and low-cholesterol foods with avoidance of saturated fats or fried foods  Immunizations, and the need to comply with current CDC's recommendations were discussed  To follow up yearly for physical exams  Other fatigue    Relevant Orders    CBC and differential    TSH, 3rd generation with Free T4 reflex    Hyperglycemia    Relevant Orders    Comprehensive metabolic panel    Hemoglobin A1C    Screening for cholesterol level    Relevant Orders    Lipid panel          Immunizations and preventive care screenings were discussed with patient today  Appropriate education was printed on patient's after visit summary  Counseling:  Alcohol/drug use: discussed moderation in alcohol intake, the recommendations for healthy alcohol use, and avoidance of illicit drug use  Dental Health: discussed importance of regular tooth brushing, flossing, and dental visits  Injury prevention: discussed safety/seat belts, safety helmets, smoke detectors, carbon dioxide detectors, and smoking near bedding or upholstery  Sexual health: discussed sexually transmitted diseases, partner selection, use of condoms, avoidance of unintended pregnancy, and contraceptive alternatives  · Exercise: the importance of regular exercise/physical activity was discussed  Recommend exercise 3-5 times per week for at least 30 minutes       BMI Counseling: Body mass index is 27 14 kg/m²  The BMI is above normal  Nutrition recommendations include encouraging healthy choices of fruits and vegetables, decreasing fast food intake, consuming healthier snacks, limiting drinks that contain sugar, increasing intake of lean protein, reducing intake of saturated and trans fat and reducing intake of cholesterol  Exercise recommendations include moderate physical activity 150 minutes/week  Depression Screening and Follow-up Plan: Clincally patient does not have depression  No treatment is required  Return in about 1 year (around 7/6/2021) for Annual physical      Chief Complaint:     Chief Complaint   Patient presents with    Physical Exam      History of Present Illness:     Adult Annual Physical   Patient here for a comprehensive physical exam  The patient reports no problems  Diet and Physical Activity  · Diet/Nutrition: well balanced diet  · Exercise: walking  Depression Screening  PHQ-9 Depression Screening    PHQ-9:    Frequency of the following problems over the past two weeks:            General Health  · Sleep: sleeps well  · Hearing: normal - bilateral   · Vision: no vision problems  · Dental: regular dental visits and brushes teeth twice daily  /GYN Health  · Last menstrual period: 05/20/20  · Contraceptive method: oral contraceptives  · History of STDs?: no      Review of Systems:     Review of Systems   Constitutional: Negative  Negative for appetite change, fatigue and fever  HENT: Negative  Eyes: Negative  Respiratory: Negative  Negative for cough, chest tightness, shortness of breath and wheezing  Cardiovascular: Negative  Negative for chest pain and palpitations  Gastrointestinal: Negative  Negative for abdominal distention  Endocrine: Negative  Genitourinary: Negative  Musculoskeletal: Negative  Negative for arthralgias and gait problem  Skin: Negative      Allergic/Immunologic: Negative  Neurological: Negative  Negative for dizziness and headaches  Hematological: Negative  Psychiatric/Behavioral: Negative         Past Medical History:     Past Medical History:   Diagnosis Date    Asthma     in early childhood    Prediabetes 2019    started tx with Metformin 7/2019      Past Surgical History:     Past Surgical History:   Procedure Laterality Date    NO PAST SURGERIES        Social History:        Social History     Socioeconomic History    Marital status: Single     Spouse name: None    Number of children: None    Years of education: None    Highest education level: None   Occupational History    None   Social Needs    Financial resource strain: None    Food insecurity:     Worry: None     Inability: None    Transportation needs:     Medical: None     Non-medical: None   Tobacco Use    Smoking status: Never Smoker    Smokeless tobacco: Never Used   Substance and Sexual Activity    Alcohol use: Yes     Frequency: Monthly or less     Drinks per session: 1 or 2    Drug use: Never    Sexual activity: Yes     Partners: Male     Birth control/protection: None   Lifestyle    Physical activity:     Days per week: None     Minutes per session: None    Stress: None   Relationships    Social connections:     Talks on phone: None     Gets together: None     Attends [employer] service: None     Active member of club or organization: None     Attends meetings of clubs or organizations: None     Relationship status: None    Intimate partner violence:     Fear of current or ex partner: None     Emotionally abused: None     Physically abused: None     Forced sexual activity: None   Other Topics Concern    None   Social History Narrative    None      Family History:     Family History   Problem Relation Age of Onset    Breast cancer Neg Hx     Cancer Neg Hx       Current Medications:     Current Outpatient Medications   Medication Sig Dispense Refill    acetaminophen (TYLENOL) 325 mg tablet Take 650 mg by mouth every 6 (six) hours as needed for mild pain      albuterol (PROVENTIL HFA,VENTOLIN HFA) 90 mcg/act inhaler Inhale 1 puff every 6 (six) hours as needed      fluticasone (FLOVENT HFA) 110 MCG/ACT inhaler Inhale 2 puffs 2 (two) times a day Rinse mouth after use  1 Inhaler 3    montelukast (SINGULAIR) 10 mg tablet Take 1 tablet (10 mg total) by mouth daily at bedtime 90 tablet 3    azelastine (OPTIVAR) 0 05 % ophthalmic solution Administer 1 drop to both eyes 2 (two) times a day (Patient not taking: Reported on 7/6/2020) 6 mL 0    levonorgestrel-ethinyl estradiol (Lessina) 0 1-20 MG-MCG per tablet Take 20 mcg by mouth daily      metFORMIN (GLUCOPHAGE) 500 mg tablet Take 500 mg by mouth daily with breakfast       No current facility-administered medications for this visit  Allergies:     No Known Allergies   Physical Exam:     /70 (BP Location: Left arm, Patient Position: Sitting, Cuff Size: Standard)   Pulse 93   Temp 97 5 °F (36 4 °C) (Temporal)   Resp 20   Ht 5' 2" (1 575 m)   Wt 67 3 kg (148 lb 6 4 oz)   LMP 05/20/2020 (Exact Date)   SpO2 97%   BMI 27 14 kg/m²     Physical Exam   Constitutional: She is oriented to person, place, and time  She appears well-developed and well-nourished  No distress  HENT:   Head: Normocephalic and atraumatic  Right Ear: External ear normal    Left Ear: External ear normal    Nose: Nose normal    Mouth/Throat: Oropharynx is clear and moist    Eyes: Pupils are equal, round, and reactive to light  Conjunctivae and EOM are normal  Right eye exhibits no discharge  Left eye exhibits no discharge  Neck: Normal range of motion  Neck supple  No thyromegaly present  Cardiovascular: Normal rate, regular rhythm, normal heart sounds and intact distal pulses  Exam reveals no gallop and no friction rub  No murmur heard  Pulmonary/Chest: Effort normal and breath sounds normal  No respiratory distress  She has no wheezes  Abdominal: Soft  Bowel sounds are normal  She exhibits no distension  There is no tenderness  Musculoskeletal: Normal range of motion  Neurological: She is alert and oriented to person, place, and time  She has normal reflexes  Skin: Skin is warm and dry  Capillary refill takes less than 2 seconds  She is not diaphoretic  Psychiatric: She has a normal mood and affect  Her behavior is normal  Judgment and thought content normal    Nursing note and vitals reviewed        Kiera Messina, [address]

## 2020-09-17 ENCOUNTER — OFFICE VISIT (OUTPATIENT)
Dept: FAMILY MEDICINE CLINIC | Facility: CLINIC | Age: 25
End: 2020-09-17
Payer: COMMERCIAL

## 2020-09-17 VITALS
HEIGHT: 62 IN | BODY MASS INDEX: 26.68 KG/M2 | WEIGHT: 145 LBS | RESPIRATION RATE: 18 BRPM | OXYGEN SATURATION: 98 % | HEART RATE: 90 BPM | SYSTOLIC BLOOD PRESSURE: 110 MMHG | TEMPERATURE: 98 F | DIASTOLIC BLOOD PRESSURE: 82 MMHG

## 2020-09-17 DIAGNOSIS — R10.11 RUQ ABDOMINAL PAIN: Primary | ICD-10-CM

## 2020-09-17 DIAGNOSIS — R11.0 NAUSEA: ICD-10-CM

## 2020-09-17 PROCEDURE — 99214 OFFICE O/P EST MOD 30 MIN: CPT | Performed by: NURSE PRACTITIONER

## 2020-09-17 PROCEDURE — 1036F TOBACCO NON-USER: CPT | Performed by: NURSE PRACTITIONER

## 2020-09-17 RX ORDER — DOXYCYCLINE 100 MG/1
TABLET ORAL
COMMUNITY
Start: 2020-09-16 | End: 2020-09-22

## 2020-09-17 RX ORDER — OMEPRAZOLE 20 MG/1
20 CAPSULE, DELAYED RELEASE ORAL DAILY
Qty: 30 CAPSULE | Refills: 1 | Status: SHIPPED | OUTPATIENT
Start: 2020-09-17 | End: 2021-04-19

## 2020-09-17 NOTE — ASSESSMENT & PLAN NOTE
-starting patient on omeprazole to trial out  Ordering US of abdomen to focus on RUQ area to check for possible gallstones  Advised to eat a low fat diet and healthy  Ordering on patient as well h pylori testing to rule out bacteria

## 2020-09-17 NOTE — PROGRESS NOTES
BMI Counseling: Body mass index is 26 52 kg/m²  The BMI is above normal  Nutrition recommendations include encouraging healthy choices of fruits and vegetables, decreasing fast food intake, consuming healthier snacks, limiting drinks that contain sugar, increasing intake of lean protein, reducing intake of saturated and trans fat and reducing intake of cholesterol  Exercise recommendations include moderate physical activity 150 minutes/week  Depression Screening and Follow-up Plan: Clincally patient does not have depression  No treatment is required  Assessment/Plan:    RUQ abdominal pain  -starting patient on omeprazole to trial out  Ordering US of abdomen to focus on RUQ area to check for possible gallstones  Advised to eat a low fat diet and healthy  Ordering on patient as well h pylori testing to rule out bacteria  Nausea  -Recommended crackers and ginger ale to help ease nausea  If it gets too bad we can give zofran to help with nausea  F/u if s/s do not resolve  Diagnoses and all orders for this visit:    RUQ abdominal pain  -     H  pylori breath test; Future  -     US abdomen limited; Future  -     omeprazole (PriLOSEC) 20 mg delayed release capsule; Take 1 capsule (20 mg total) by mouth daily    Nausea    Other orders  -     doxycycline (ADOXA) 100 MG tablet          Subjective:      Patient ID: Isaac Rivera is a 23 y o  female  Patient presents with:  Abdominal Pain: x 2 weeks    23year old female patient here for abdominal pain and nausea  States its like a burning feeling in her stomache  Abdominal Pain   This is a recurrent problem  The current episode started 1 to 4 weeks ago (2 weeks ago)  The onset quality is gradual  The problem occurs every several days  The problem has been waxing and waning  The pain is located in the RUQ  The pain is at a severity of 6/10  The pain is moderate  The quality of the pain is aching  The abdominal pain does not radiate   Associated symptoms include belching, flatus and nausea  Pertinent negatives include no arthralgias, constipation, diarrhea, fever, headaches or vomiting  The pain is aggravated by eating  Relieved by: pepto bismol  The treatment provided moderate relief  Her past medical history is significant for abdominal surgery  There is no history of Crohn's disease, gallstones, irritable bowel syndrome, pancreatitis or ulcerative colitis  Nausea   This is a new problem  The current episode started 1 to 4 weeks ago  The problem occurs intermittently  The problem has been waxing and waning  Associated symptoms include abdominal pain (RUQ area) and nausea  Pertinent negatives include no arthralgias, chest pain, coughing, diaphoresis, fatigue, fever, headaches, rash, vertigo or vomiting  The symptoms are aggravated by eating  She has tried nothing for the symptoms  The treatment provided no relief  The following portions of the patient's history were reviewed and updated as appropriate: allergies, current medications, past family history, past medical history, past social history, past surgical history and problem list     Review of Systems   Constitutional: Negative  Negative for appetite change, diaphoresis, fatigue and fever  HENT: Negative  Eyes: Negative  Respiratory: Negative  Negative for cough, chest tightness, shortness of breath and wheezing  Cardiovascular: Negative  Negative for chest pain and palpitations  Gastrointestinal: Positive for abdominal pain (RUQ area), flatus and nausea  Negative for constipation, diarrhea and vomiting  Endocrine: Negative  Genitourinary: Negative  Musculoskeletal: Negative  Negative for arthralgias  Skin: Negative  Negative for color change and rash  Allergic/Immunologic: Negative  Neurological: Negative  Negative for dizziness, vertigo and headaches  Hematological: Negative  Psychiatric/Behavioral: Negative            Objective:      /82 (BP Location: Left arm, Patient Position: Sitting, Cuff Size: Standard)   Pulse 90   Temp 98 °F (36 7 °C) (Temporal)   Resp 18   Ht 5' 2" (1 575 m)   Wt 65 8 kg (145 lb)   SpO2 98%   BMI 26 52 kg/m²          Physical Exam  Vitals signs and nursing note reviewed  Constitutional:       General: She is not in acute distress  Appearance: Normal appearance  She is not ill-appearing, toxic-appearing or diaphoretic  HENT:      Head: Normocephalic and atraumatic  Right Ear: External ear normal       Left Ear: External ear normal       Nose: Nose normal  No congestion or rhinorrhea  Mouth/Throat:      Mouth: Mucous membranes are moist       Pharynx: Oropharynx is clear  No oropharyngeal exudate  Eyes:      Extraocular Movements: Extraocular movements intact  Conjunctiva/sclera: Conjunctivae normal       Pupils: Pupils are equal, round, and reactive to light  Neck:      Musculoskeletal: Normal range of motion and neck supple  Cardiovascular:      Rate and Rhythm: Normal rate and regular rhythm  Pulses: Normal pulses  Heart sounds: Normal heart sounds  Pulmonary:      Effort: Pulmonary effort is normal  No respiratory distress  Breath sounds: Normal breath sounds  Abdominal:      General: Bowel sounds are normal       Palpations: Abdomen is soft  Tenderness: There is abdominal tenderness (mildly tender to palpation) in the right upper quadrant  Negative signs include Chu's sign, Rovsing's sign, McBurney's sign, psoas sign and obturator sign  Musculoskeletal: Normal range of motion  Skin:     General: Skin is warm and dry  Capillary Refill: Capillary refill takes less than 2 seconds  Neurological:      General: No focal deficit present  Mental Status: She is alert and oriented to person, place, and time  Psychiatric:         Mood and Affect: Mood normal          Behavior: Behavior normal          Thought Content:  Thought content normal          Judgment: Judgment normal

## 2020-09-17 NOTE — ASSESSMENT & PLAN NOTE
-Recommended crackers and ginger ale to help ease nausea  If it gets too bad we can give zofran to help with nausea  F/u if s/s do not resolve

## 2020-09-19 ENCOUNTER — HOSPITAL ENCOUNTER (OUTPATIENT)
Dept: ULTRASOUND IMAGING | Facility: HOSPITAL | Age: 25
Discharge: HOME | End: 2020-09-19
Payer: COMMERCIAL

## 2020-09-19 ENCOUNTER — APPOINTMENT (OUTPATIENT)
Dept: LAB | Facility: HOSPITAL | Age: 25
End: 2020-09-19
Payer: COMMERCIAL

## 2020-09-19 DIAGNOSIS — R10.11 RUQ ABDOMINAL PAIN: ICD-10-CM

## 2020-09-19 PROCEDURE — 83013 H PYLORI (C-13) BREATH: CPT

## 2020-09-19 PROCEDURE — 83014 H PYLORI DRUG ADMIN: CPT

## 2020-09-19 PROCEDURE — 76705 ECHO EXAM OF ABDOMEN: CPT

## 2020-09-22 ENCOUNTER — TELEPHONE (OUTPATIENT)
Dept: FAMILY MEDICINE CLINIC | Facility: CLINIC | Age: 25
End: 2020-09-22

## 2020-09-22 DIAGNOSIS — A04.8 H. PYLORI INFECTION: Primary | ICD-10-CM

## 2020-09-22 LAB — SCAN RESULT: NORMAL

## 2020-09-22 RX ORDER — AMOXICILLIN 500 MG/1
1000 CAPSULE ORAL 2 TIMES DAILY
Qty: 56 CAPSULE | Refills: 0 | Status: SHIPPED | OUTPATIENT
Start: 2020-09-22 | End: 2020-10-06

## 2020-09-22 RX ORDER — METRONIDAZOLE 500 MG/1
500 TABLET ORAL 2 TIMES DAILY
Qty: 28 TABLET | Refills: 0 | Status: SHIPPED | OUTPATIENT
Start: 2020-09-22 | End: 2020-10-06

## 2020-09-22 RX ORDER — OMEPRAZOLE 20 MG/1
20 CAPSULE, DELAYED RELEASE ORAL 2 TIMES DAILY
Qty: 28 CAPSULE | Refills: 0 | Status: SHIPPED | OUTPATIENT
Start: 2020-09-22 | End: 2021-04-19

## 2020-12-08 ENCOUNTER — PATIENT MESSAGE (OUTPATIENT)
Dept: FAMILY MEDICINE CLINIC | Facility: CLINIC | Age: 25
End: 2020-12-08

## 2020-12-08 ENCOUNTER — TELEPHONE (OUTPATIENT)
Dept: FAMILY MEDICINE CLINIC | Facility: CLINIC | Age: 25
End: 2020-12-08

## 2020-12-08 DIAGNOSIS — R10.9 RECURRENT ABDOMINAL PAIN: Primary | ICD-10-CM

## 2020-12-09 LAB — EXTERNAL CHLAMYDIA RESULT: NEGATIVE

## 2021-04-08 ENCOUNTER — TELEPHONE (OUTPATIENT)
Dept: FAMILY MEDICINE CLINIC | Facility: CLINIC | Age: 26
End: 2021-04-08

## 2021-04-16 ENCOUNTER — TELEPHONE (OUTPATIENT)
Dept: ADMINISTRATIVE | Facility: OTHER | Age: 26
End: 2021-04-16

## 2021-04-16 NOTE — TELEPHONE ENCOUNTER
Upon review of the In Basket request we were able to locate, review, and update the patient chart as requested for Chlamydia and Pap Smear (HPV) aka Cervical Cancer Screening  Any additional questions or concerns should be emailed to the Practice Liaisons via [email or web address] email, please do not reply via In Basket      Thank you  Ashok Cruz

## 2021-04-16 NOTE — TELEPHONE ENCOUNTER
----- Message from Cleopatra Thomas RN sent at 4/15/2021  2:09 PM EDT -----  Regarding: pap, chlamydia  04/15/21 2:10 PM    Hello, our patient Salvatore Lau has had Chlamydia and Pap Smear (HPV) aka Cervical Cancer Screening completed/performed  Please assist in updating the patient chart by pulling the Care Everywhere (CE) document  The date of service is 7/2020,12,2020       Thank you,  Cleopatra Thomas RN   [address]

## 2021-04-19 ENCOUNTER — TELEPHONE (OUTPATIENT)
Dept: ADMINISTRATIVE | Facility: OTHER | Age: 26
End: 2021-04-19

## 2021-04-19 ENCOUNTER — OFFICE VISIT (OUTPATIENT)
Dept: FAMILY MEDICINE CLINIC | Facility: CLINIC | Age: 26
End: 2021-04-19
Payer: COMMERCIAL

## 2021-04-19 VITALS
DIASTOLIC BLOOD PRESSURE: 72 MMHG | HEART RATE: 94 BPM | OXYGEN SATURATION: 99 % | HEIGHT: 62 IN | TEMPERATURE: 98.3 F | WEIGHT: 147 LBS | SYSTOLIC BLOOD PRESSURE: 112 MMHG | BODY MASS INDEX: 27.05 KG/M2 | RESPIRATION RATE: 18 BRPM

## 2021-04-19 DIAGNOSIS — N63.0 BREAST LUMP IN FEMALE: ICD-10-CM

## 2021-04-19 DIAGNOSIS — R10.9 RECURRENT ABDOMINAL PAIN: Primary | ICD-10-CM

## 2021-04-19 DIAGNOSIS — R53.83 OTHER FATIGUE: ICD-10-CM

## 2021-04-19 DIAGNOSIS — E78.2 MIXED HYPERLIPIDEMIA: ICD-10-CM

## 2021-04-19 DIAGNOSIS — R73.9 HYPERGLYCEMIA: ICD-10-CM

## 2021-04-19 PROCEDURE — 3008F BODY MASS INDEX DOCD: CPT | Performed by: NURSE PRACTITIONER

## 2021-04-19 PROCEDURE — 3725F SCREEN DEPRESSION PERFORMED: CPT | Performed by: NURSE PRACTITIONER

## 2021-04-19 PROCEDURE — 99214 OFFICE O/P EST MOD 30 MIN: CPT | Performed by: NURSE PRACTITIONER

## 2021-04-19 RX ORDER — METRONIDAZOLE 7.5 MG/G
GEL VAGINAL
COMMUNITY
Start: 2021-02-19 | End: 2021-09-15

## 2021-04-19 RX ORDER — OMEPRAZOLE 40 MG/1
40 CAPSULE, DELAYED RELEASE ORAL DAILY
Qty: 30 CAPSULE | Refills: 3 | Status: SHIPPED | OUTPATIENT
Start: 2021-04-19 | End: 2021-08-16 | Stop reason: SDUPTHER

## 2021-04-19 RX ORDER — METRONIDAZOLE 7.5 MG/G
GEL VAGINAL
COMMUNITY
Start: 2021-02-19

## 2021-04-19 RX ORDER — METRONIDAZOLE 500 MG/1
TABLET ORAL
COMMUNITY
Start: 2021-02-19 | End: 2021-05-11 | Stop reason: SDUPTHER

## 2021-04-19 NOTE — TELEPHONE ENCOUNTER
Upon review of the In Basket request we were able to note that no further action is required  The patient chart is up to date as a result of a previous request       Any additional questions or concerns should be emailed to the Practice Liaisons via [email or web address] email, please do not reply via In Basket      Thank you  Jd Scott

## 2021-04-19 NOTE — ASSESSMENT & PLAN NOTE
-this has been a recurrent problem  Does take omeprazole 20mg with mild relief  We are increasing her dose to 40mg daily  Pt to have h pylori testing done again  Pt refused to have endoscopy as it cost her too much money to do  Will f/u with patient once labs are in

## 2021-04-19 NOTE — TELEPHONE ENCOUNTER
----- Message from Jimbo Avila sent at 4/16/2021 12:49 PM EDT -----  Regarding: chlamydia  04/16/21 12:49 PM    Hello, our patient No patient name on file  has had Chlamydia completed/performed  Please assist in updating the patient chart by pulling the Care Everywhere (CE) document  The date of service is 12/9/20       Thank you,  Vasiliy Vines   [address]

## 2021-04-19 NOTE — PROGRESS NOTES
BMI Counseling: Body mass index is 26 89 kg/m²  The BMI is above normal  Nutrition recommendations include encouraging healthy choices of fruits and vegetables, decreasing fast food intake, consuming healthier snacks, limiting drinks that contain sugar, increasing intake of lean protein, reducing intake of saturated and trans fat and reducing intake of cholesterol  Exercise recommendations include exercising 3-5 times per week  Depression Screening and Follow-up Plan: Patient's depression screening was positive with a PHQ-2 score of 0  Clincally patient does not have depression  No treatment is required  Assessment/Plan:    Breast lump in female  -ordering US of breast as felt small fingertip size nodule on right breast      Recurrent abdominal pain  -this has been a recurrent problem  Does take omeprazole 20mg with mild relief  We are increasing her dose to 40mg daily  Pt to have h pylori testing done again  Pt refused to have endoscopy as it cost her too much money to do  Will f/u with patient once labs are in  Diagnoses and all orders for this visit:    Recurrent abdominal pain  -     H  pylori antigen, stool; Future  -     omeprazole (PriLOSEC) 40 MG capsule; Take 1 capsule (40 mg total) by mouth daily    Breast lump in female  -     US breast right limited (diagnostic); Future    Other fatigue  -     CBC and differential; Future  -     TSH, 3rd generation with Free T4 reflex; Future    Mixed hyperlipidemia  -     Lipid panel; Future    Hyperglycemia  -     Comprehensive metabolic panel; Future  -     Hemoglobin A1C; Future    Other orders  -     metroNIDAZOLE (METROGEL) 0 75 % vaginal gel; ONE APPLICATOR PER VAGINA TWICE WEEKLY FOR 5 WEEKS  -     metroNIDAZOLE (METROGEL) 0 75 % vaginal gel;  One applicator per vagina twice weekly for 5 weeks  -     metroNIDAZOLE (FLAGYL) 500 mg tablet; TAKE 1 TABLET (500 MG TOTAL) BY MOUTH 2 (TWO) TIMES A DAY FOR 7 DAYS          Subjective:      Patient ID: Donal Dunbar is a 23 y o  female  23year old female patient states she has recurrent abdominal pain  Had h pylori and was treated in 9/2020 and now its back since jan 2021  Also complaint of right side of breast with a lump noted  States it does not hurt but she feels it  Always present per patient  Denies any family history of breast cancer  Denies any nipple discharge per patient  Abdominal Pain  This is a recurrent problem  The current episode started more than 1 month ago (this jan 2021)  The onset quality is gradual  The problem occurs constantly  The problem has been gradually worsening  The pain is located in the epigastric region  The pain is at a severity of 10/10  The pain is severe  The quality of the pain is burning  The abdominal pain does not radiate  Associated symptoms include belching, flatus and nausea  Pertinent negatives include no constipation, diarrhea, fever or vomiting  Nothing aggravates the pain  The pain is relieved by nothing  She has tried proton pump inhibitors for the symptoms  The treatment provided mild relief  Prior diagnostic workup includes GI consult  Her past medical history is significant for GERD  There is no history of abdominal surgery, Crohn's disease, gallstones, irritable bowel syndrome, pancreatitis or ulcerative colitis  The following portions of the patient's history were reviewed and updated as appropriate: allergies, current medications, past family history, past medical history, past social history, past surgical history and problem list     Review of Systems   Constitutional: Negative  Negative for appetite change, fatigue and fever  HENT: Negative  Eyes: Negative  Respiratory: Negative  Negative for cough, chest tightness, shortness of breath and wheezing  Cardiovascular: Negative  Negative for chest pain and palpitations  Left breast lump   Gastrointestinal: Positive for abdominal pain (epigastric pain), flatus and nausea  Negative for constipation, diarrhea and vomiting  Endocrine: Negative  Genitourinary: Negative  Musculoskeletal: Negative  Skin: Negative  Allergic/Immunologic: Negative  Neurological: Negative  Hematological: Negative  Psychiatric/Behavioral: Negative  Objective:      /72 (BP Location: Left arm, Patient Position: Sitting, Cuff Size: Standard)   Pulse 94   Temp 98 3 °F (36 8 °C) (Temporal)   Resp 18   Ht 5' 2" (1 575 m)   Wt 66 7 kg (147 lb)   SpO2 99%   BMI 26 89 kg/m²          Physical Exam  Vitals signs and nursing note reviewed  Constitutional:       General: She is not in acute distress  Appearance: Normal appearance  She is not ill-appearing  HENT:      Head: Normocephalic and atraumatic  Right Ear: External ear normal       Left Ear: External ear normal       Nose: Nose normal  No congestion or rhinorrhea  Mouth/Throat:      Mouth: Mucous membranes are moist       Pharynx: Oropharynx is clear  No oropharyngeal exudate  Eyes:      Extraocular Movements: Extraocular movements intact  Conjunctiva/sclera: Conjunctivae normal       Pupils: Pupils are equal, round, and reactive to light  Neck:      Musculoskeletal: Normal range of motion and neck supple  Cardiovascular:      Rate and Rhythm: Normal rate and regular rhythm  Pulses: Normal pulses  Heart sounds: Normal heart sounds  Pulmonary:      Effort: Pulmonary effort is normal  No respiratory distress  Breath sounds: Normal breath sounds  Chest:      Breasts:         Right: Normal  No swelling, bleeding, inverted nipple, mass, nipple discharge, skin change or tenderness  Abdominal:      General: Bowel sounds are normal  There is no distension  Palpations: Abdomen is soft  There is no mass  Tenderness: There is no abdominal tenderness  Hernia: No hernia is present  Musculoskeletal: Normal range of motion  Skin:     General: Skin is warm and dry  Capillary Refill: Capillary refill takes less than 2 seconds  Neurological:      General: No focal deficit present  Mental Status: She is alert and oriented to person, place, and time     Psychiatric:         Mood and Affect: Mood normal          Behavior: Behavior normal              Chief Complaint   Patient presents with    Abdominal Pain

## 2021-04-24 ENCOUNTER — APPOINTMENT (OUTPATIENT)
Dept: LAB | Facility: HOSPITAL | Age: 26
End: 2021-04-24
Payer: COMMERCIAL

## 2021-04-24 DIAGNOSIS — E78.2 MIXED HYPERLIPIDEMIA: ICD-10-CM

## 2021-04-24 DIAGNOSIS — R10.9 RECURRENT ABDOMINAL PAIN: ICD-10-CM

## 2021-04-24 DIAGNOSIS — R53.83 OTHER FATIGUE: ICD-10-CM

## 2021-04-24 DIAGNOSIS — R73.9 HYPERGLYCEMIA: ICD-10-CM

## 2021-04-24 LAB
ALBUMIN SERPL BCP-MCNC: 3.9 G/DL (ref 3.5–5)
ALP SERPL-CCNC: 40 U/L (ref 46–116)
ALT SERPL W P-5'-P-CCNC: 32 U/L (ref 12–78)
ANION GAP SERPL CALCULATED.3IONS-SCNC: 8 MMOL/L (ref 4–13)
AST SERPL W P-5'-P-CCNC: 24 U/L (ref 5–45)
BASOPHILS # BLD AUTO: 0.03 THOUSANDS/ΜL (ref 0–0.1)
BASOPHILS NFR BLD AUTO: 1 % (ref 0–1)
BILIRUB SERPL-MCNC: 0.33 MG/DL (ref 0.2–1)
BUN SERPL-MCNC: 13 MG/DL (ref 5–25)
CALCIUM SERPL-MCNC: 9.1 MG/DL (ref 8.3–10.1)
CHLORIDE SERPL-SCNC: 102 MMOL/L (ref 100–108)
CHOLEST SERPL-MCNC: 126 MG/DL (ref 50–200)
CO2 SERPL-SCNC: 28 MMOL/L (ref 21–32)
CREAT SERPL-MCNC: 0.84 MG/DL (ref 0.6–1.3)
EOSINOPHIL # BLD AUTO: 0.21 THOUSAND/ΜL (ref 0–0.61)
EOSINOPHIL NFR BLD AUTO: 3 % (ref 0–6)
ERYTHROCYTE [DISTWIDTH] IN BLOOD BY AUTOMATED COUNT: 12.1 % (ref 11.6–15.1)
EST. AVERAGE GLUCOSE BLD GHB EST-MCNC: 111 MG/DL
GFR SERPL CREATININE-BSD FRML MDRD: 98 ML/MIN/1.73SQ M
GLUCOSE P FAST SERPL-MCNC: 92 MG/DL (ref 65–99)
HBA1C MFR BLD: 5.5 %
HCT VFR BLD AUTO: 39 % (ref 34.8–46.1)
HDLC SERPL-MCNC: 39 MG/DL
HGB BLD-MCNC: 12.7 G/DL (ref 11.5–15.4)
IMM GRANULOCYTES # BLD AUTO: 0.02 THOUSAND/UL (ref 0–0.2)
IMM GRANULOCYTES NFR BLD AUTO: 0 % (ref 0–2)
LDLC SERPL CALC-MCNC: 66 MG/DL (ref 0–100)
LYMPHOCYTES # BLD AUTO: 2.48 THOUSANDS/ΜL (ref 0.6–4.47)
LYMPHOCYTES NFR BLD AUTO: 39 % (ref 14–44)
MCH RBC QN AUTO: 26.6 PG (ref 26.8–34.3)
MCHC RBC AUTO-ENTMCNC: 32.6 G/DL (ref 31.4–37.4)
MCV RBC AUTO: 82 FL (ref 82–98)
MONOCYTES # BLD AUTO: 0.4 THOUSAND/ΜL (ref 0.17–1.22)
MONOCYTES NFR BLD AUTO: 6 % (ref 4–12)
NEUTROPHILS # BLD AUTO: 3.24 THOUSANDS/ΜL (ref 1.85–7.62)
NEUTS SEG NFR BLD AUTO: 51 % (ref 43–75)
NONHDLC SERPL-MCNC: 87 MG/DL
NRBC BLD AUTO-RTO: 0 /100 WBCS
PLATELET # BLD AUTO: 301 THOUSANDS/UL (ref 149–390)
PMV BLD AUTO: 10.7 FL (ref 8.9–12.7)
POTASSIUM SERPL-SCNC: 3.7 MMOL/L (ref 3.5–5.3)
PROT SERPL-MCNC: 7.6 G/DL (ref 6.4–8.2)
RBC # BLD AUTO: 4.77 MILLION/UL (ref 3.81–5.12)
SODIUM SERPL-SCNC: 138 MMOL/L (ref 136–145)
TRIGL SERPL-MCNC: 107 MG/DL
TSH SERPL DL<=0.05 MIU/L-ACNC: 3.06 UIU/ML (ref 0.36–3.74)
WBC # BLD AUTO: 6.38 THOUSAND/UL (ref 4.31–10.16)

## 2021-04-24 PROCEDURE — 84443 ASSAY THYROID STIM HORMONE: CPT

## 2021-04-24 PROCEDURE — 36415 COLL VENOUS BLD VENIPUNCTURE: CPT

## 2021-04-24 PROCEDURE — 80053 COMPREHEN METABOLIC PANEL: CPT

## 2021-04-24 PROCEDURE — 80061 LIPID PANEL: CPT

## 2021-04-24 PROCEDURE — 83036 HEMOGLOBIN GLYCOSYLATED A1C: CPT

## 2021-04-24 PROCEDURE — 85025 COMPLETE CBC W/AUTO DIFF WBC: CPT

## 2021-05-08 ENCOUNTER — APPOINTMENT (OUTPATIENT)
Dept: LAB | Facility: HOSPITAL | Age: 26
End: 2021-05-08
Payer: COMMERCIAL

## 2021-05-08 PROCEDURE — 87338 HPYLORI STOOL AG IA: CPT

## 2021-05-10 LAB — H PYLORI AG STL QL IA: POSITIVE

## 2021-05-11 ENCOUNTER — TELEPHONE (OUTPATIENT)
Dept: FAMILY MEDICINE CLINIC | Facility: CLINIC | Age: 26
End: 2021-05-11

## 2021-05-11 DIAGNOSIS — A04.8 H. PYLORI INFECTION: Primary | ICD-10-CM

## 2021-05-11 RX ORDER — OMEPRAZOLE 20 MG/1
20 CAPSULE, DELAYED RELEASE ORAL 2 TIMES DAILY
Qty: 28 CAPSULE | Refills: 0 | Status: SHIPPED | OUTPATIENT
Start: 2021-05-11 | End: 2021-05-25

## 2021-05-11 RX ORDER — AMOXICILLIN 500 MG/1
1000 CAPSULE ORAL 2 TIMES DAILY
Qty: 56 CAPSULE | Refills: 0 | Status: SHIPPED | OUTPATIENT
Start: 2021-05-11 | End: 2021-05-25

## 2021-05-11 RX ORDER — METRONIDAZOLE 500 MG/1
500 TABLET ORAL 2 TIMES DAILY
Qty: 28 TABLET | Refills: 0 | Status: SHIPPED | OUTPATIENT
Start: 2021-05-11 | End: 2021-05-25

## 2021-05-25 ENCOUNTER — HOSPITAL ENCOUNTER (OUTPATIENT)
Dept: MAMMOGRAPHY | Facility: CLINIC | Age: 26
Discharge: HOME | End: 2021-05-25
Payer: COMMERCIAL

## 2021-05-25 VITALS — WEIGHT: 147 LBS | BODY MASS INDEX: 27.05 KG/M2 | HEIGHT: 62 IN

## 2021-05-25 DIAGNOSIS — N63.0 BREAST LUMP IN FEMALE: ICD-10-CM

## 2021-05-25 PROCEDURE — 76642 ULTRASOUND BREAST LIMITED: CPT

## 2021-05-27 ENCOUNTER — TELEPHONE (OUTPATIENT)
Dept: FAMILY MEDICINE CLINIC | Facility: CLINIC | Age: 26
End: 2021-05-27

## 2021-05-27 NOTE — TELEPHONE ENCOUNTER
----- Message from Annette Peters [address] sent at 5/27/2021  9:13 AM EDT -----  Please call patient and inform that US breast showed no evidence of malignancy

## 2021-06-09 ENCOUNTER — OFFICE VISIT (OUTPATIENT)
Dept: FAMILY MEDICINE CLINIC | Facility: CLINIC | Age: 26
End: 2021-06-09
Payer: COMMERCIAL

## 2021-06-09 VITALS
RESPIRATION RATE: 18 BRPM | TEMPERATURE: 98 F | SYSTOLIC BLOOD PRESSURE: 110 MMHG | WEIGHT: 149 LBS | OXYGEN SATURATION: 98 % | HEIGHT: 62 IN | BODY MASS INDEX: 27.42 KG/M2 | DIASTOLIC BLOOD PRESSURE: 72 MMHG | HEART RATE: 94 BPM

## 2021-06-09 DIAGNOSIS — J30.1 SEASONAL ALLERGIC RHINITIS DUE TO POLLEN: Primary | ICD-10-CM

## 2021-06-09 DIAGNOSIS — J45.40 MODERATE PERSISTENT ASTHMA WITHOUT COMPLICATION: ICD-10-CM

## 2021-06-09 PROCEDURE — 99214 OFFICE O/P EST MOD 30 MIN: CPT | Performed by: NURSE PRACTITIONER

## 2021-06-09 PROCEDURE — 1036F TOBACCO NON-USER: CPT | Performed by: NURSE PRACTITIONER

## 2021-06-09 PROCEDURE — 3008F BODY MASS INDEX DOCD: CPT | Performed by: NURSE PRACTITIONER

## 2021-06-09 RX ORDER — CETIRIZINE HYDROCHLORIDE, PSEUDOEPHEDRINE HYDROCHLORIDE 5; 120 MG/1; MG/1
1 TABLET, FILM COATED, EXTENDED RELEASE ORAL 2 TIMES DAILY
Qty: 14 TABLET | Refills: 0 | Status: SHIPPED | OUTPATIENT
Start: 2021-06-09 | End: 2021-06-16

## 2021-06-09 RX ORDER — FLUTICASONE PROPIONATE 50 MCG
2 SPRAY, SUSPENSION (ML) NASAL DAILY
Qty: 16 G | Refills: 0 | Status: SHIPPED | OUTPATIENT
Start: 2021-06-09 | End: 2021-09-15

## 2021-06-09 NOTE — ASSESSMENT & PLAN NOTE
I am recommending patient avoid triggers  Minimize exposure to irritants like smoke, perfumes, cosmetics, hair spray, and other odors  Use of nasal saline spray and cleaning out nares as much as possible  Offered intranasal corticosteroids like Flonase  Recommended taking a daily oral anti-histamine  To f/u if s/s do not resolve

## 2021-06-09 NOTE — PROGRESS NOTES
Assessment/Plan:    Seasonal allergic rhinitis due to pollen  I am recommending patient avoid triggers  Minimize exposure to irritants like smoke, perfumes, cosmetics, hair spray, and other odors  Use of nasal saline spray and cleaning out nares as much as possible  Offered intranasal corticosteroids like Flonase  Recommended taking a daily oral anti-histamine  To f/u if s/s do not resolve  Diagnoses and all orders for this visit:    Seasonal allergic rhinitis due to pollen  -     fluticasone (FLONASE) 50 mcg/act nasal spray; 2 sprays into each nostril daily  -     cetirizine-pseudoephedrine (ZyrTEC-D) 5-120 MG per tablet; Take 1 tablet by mouth 2 (two) times a day for 7 days    Moderate persistent asthma without complication          Subjective:      Patient ID: Melissa Leahy is a 23 y o  female  23year old female patient states she received COVID vaccine on Thursday and thinks its related to the COVID vaccine s/e  She admits to nasal congesition, no cough, sneezing at times  Itchy throat per patient  States she had fever yesterday but no fever today  Admits to seasonal allergies  The following portions of the patient's history were reviewed and updated as appropriate: allergies, current medications, past family history, past medical history, past social history, past surgical history and problem list     Review of Systems   Constitutional: Positive for fever (subjective fever)  Negative for appetite change and fatigue  HENT: Positive for congestion and sneezing  Negative for ear pain and sore throat  Eyes: Negative  Respiratory: Negative  Negative for cough, chest tightness, shortness of breath and wheezing  Cardiovascular: Negative  Negative for chest pain and palpitations  Gastrointestinal: Negative  Endocrine: Negative  Genitourinary: Negative  Musculoskeletal: Negative  Skin: Negative  Allergic/Immunologic: Positive for environmental allergies     Neurological: Negative  Negative for dizziness and headaches  Hematological: Negative  Psychiatric/Behavioral: Negative  Objective:      /72 (BP Location: Left arm, Patient Position: Sitting, Cuff Size: Standard)   Pulse 94   Temp 98 °F (36 7 °C) (Temporal)   Resp 18   Ht 5' 2" (1 575 m)   Wt 67 6 kg (149 lb)   LMP 05/16/2021 (Exact Date)   SpO2 98%   BMI 27 25 kg/m²          Physical Exam  Vitals signs and nursing note reviewed  Constitutional:       General: She is not in acute distress  Appearance: Normal appearance  She is not ill-appearing  HENT:      Head: Normocephalic and atraumatic  Right Ear: External ear normal       Left Ear: External ear normal       Nose: Congestion and rhinorrhea present  Right Turbinates: Enlarged  Left Turbinates: Enlarged  Mouth/Throat:      Mouth: Mucous membranes are moist       Pharynx: Oropharynx is clear  No oropharyngeal exudate  Eyes:      Conjunctiva/sclera: Conjunctivae normal    Neck:      Musculoskeletal: Normal range of motion and neck supple  Cardiovascular:      Rate and Rhythm: Normal rate and regular rhythm  Pulses: Normal pulses  Heart sounds: Normal heart sounds  Pulmonary:      Effort: Pulmonary effort is normal  No respiratory distress  Breath sounds: Normal breath sounds  Musculoskeletal: Normal range of motion  Skin:     General: Skin is warm and dry  Capillary Refill: Capillary refill takes less than 2 seconds  Neurological:      General: No focal deficit present  Mental Status: She is alert and oriented to person, place, and time     Psychiatric:         Mood and Affect: Mood normal          Behavior: Behavior normal            Chief Complaint   Patient presents with    Nasal Congestion

## 2021-08-06 ENCOUNTER — APPOINTMENT (OUTPATIENT)
Dept: LAB | Facility: CLINIC | Age: 26
End: 2021-08-06
Payer: COMMERCIAL

## 2021-08-06 ENCOUNTER — OFFICE VISIT (OUTPATIENT)
Dept: FAMILY MEDICINE CLINIC | Facility: CLINIC | Age: 26
End: 2021-08-06
Payer: COMMERCIAL

## 2021-08-06 VITALS
WEIGHT: 148 LBS | SYSTOLIC BLOOD PRESSURE: 120 MMHG | BODY MASS INDEX: 27.23 KG/M2 | RESPIRATION RATE: 20 BRPM | OXYGEN SATURATION: 99 % | HEIGHT: 62 IN | DIASTOLIC BLOOD PRESSURE: 70 MMHG | HEART RATE: 97 BPM | TEMPERATURE: 97.8 F

## 2021-08-06 DIAGNOSIS — J45.41 MODERATE PERSISTENT ASTHMA WITH EXACERBATION: ICD-10-CM

## 2021-08-06 DIAGNOSIS — J45.909 ASTHMA DUE TO ENVIRONMENTAL ALLERGIES: ICD-10-CM

## 2021-08-06 DIAGNOSIS — Z11.59 ENCOUNTER FOR HEPATITIS C SCREENING TEST FOR LOW RISK PATIENT: ICD-10-CM

## 2021-08-06 DIAGNOSIS — J45.40 MODERATE PERSISTENT ASTHMA WITHOUT COMPLICATION: ICD-10-CM

## 2021-08-06 DIAGNOSIS — Z11.59 ENCOUNTER FOR HEPATITIS C SCREENING TEST FOR LOW RISK PATIENT: Primary | ICD-10-CM

## 2021-08-06 PROBLEM — R11.0 NAUSEA: Status: RESOLVED | Noted: 2020-09-17 | Resolved: 2021-08-06

## 2021-08-06 PROBLEM — Z00.00 ANNUAL PHYSICAL EXAM: Status: RESOLVED | Noted: 2020-07-06 | Resolved: 2021-08-06

## 2021-08-06 PROBLEM — B97.21 SARS-ASSOCIATED CORONAVIRUS INFECTION: Status: RESOLVED | Noted: 2020-05-15 | Resolved: 2021-08-06

## 2021-08-06 PROBLEM — R10.11 RUQ ABDOMINAL PAIN: Status: RESOLVED | Noted: 2020-09-17 | Resolved: 2021-08-06

## 2021-08-06 PROBLEM — Z13.220 SCREENING FOR CHOLESTEROL LEVEL: Status: RESOLVED | Noted: 2020-07-06 | Resolved: 2021-08-06

## 2021-08-06 PROBLEM — H10.13 ALLERGIC CONJUNCTIVITIS OF BOTH EYES: Status: RESOLVED | Noted: 2020-05-18 | Resolved: 2021-08-06

## 2021-08-06 PROBLEM — R53.83 OTHER FATIGUE: Status: RESOLVED | Noted: 2020-07-06 | Resolved: 2021-08-06

## 2021-08-06 LAB — HCV AB SER QL: NORMAL

## 2021-08-06 PROCEDURE — 99214 OFFICE O/P EST MOD 30 MIN: CPT | Performed by: PHYSICIAN ASSISTANT

## 2021-08-06 PROCEDURE — 1036F TOBACCO NON-USER: CPT | Performed by: PHYSICIAN ASSISTANT

## 2021-08-06 PROCEDURE — 86803 HEPATITIS C AB TEST: CPT

## 2021-08-06 PROCEDURE — 36415 COLL VENOUS BLD VENIPUNCTURE: CPT

## 2021-08-06 PROCEDURE — 3008F BODY MASS INDEX DOCD: CPT | Performed by: PHYSICIAN ASSISTANT

## 2021-08-06 RX ORDER — ALBUTEROL SULFATE 90 UG/1
2 AEROSOL, METERED RESPIRATORY (INHALATION) EVERY 6 HOURS PRN
Qty: 6.7 G | Refills: 3 | Status: SHIPPED | OUTPATIENT
Start: 2021-08-06

## 2021-08-06 RX ORDER — MONTELUKAST SODIUM 10 MG/1
10 TABLET ORAL
Qty: 90 TABLET | Refills: 3 | Status: SHIPPED | OUTPATIENT
Start: 2021-08-06

## 2021-08-06 RX ORDER — FLUTICASONE PROPIONATE 110 UG/1
2 AEROSOL, METERED RESPIRATORY (INHALATION) 2 TIMES DAILY
Qty: 12 G | Refills: 3 | Status: SHIPPED | OUTPATIENT
Start: 2021-08-06

## 2021-08-06 RX ORDER — PREDNISONE 20 MG/1
40 TABLET ORAL DAILY
Qty: 10 TABLET | Refills: 0 | Status: SHIPPED | OUTPATIENT
Start: 2021-08-06 | End: 2021-08-11

## 2021-08-06 RX ORDER — ALBUTEROL SULFATE 90 UG/1
2 AEROSOL, METERED RESPIRATORY (INHALATION) EVERY 6 HOURS PRN
COMMUNITY
End: 2021-08-06 | Stop reason: SDUPTHER

## 2021-08-06 NOTE — PROGRESS NOTES
Assessment/Plan: Moderate persistent asthma without complication  Unstable  Pt using Flovent BID and albuterol prn with minimal relief of symptoms of SOB, cough, wheezing  +nighttime symptoms several times per week  Pt reports subjective fever this AM and rhinorrhea  +seasonal allergies  She reports that her symptoms are usually worse in the morning and she did not take any medications or inhalers this morning  Exam unremarkable  No wheezing on auscultation of lungs, no respiratory distress noted  Continue Flovent and albuterol  Begin taking montelukast daily again, as well as using flonase nasal spray daily to help with allergic symptoms  Consider that mild URI may also be contributing to exacerbation given subjective fever and rhinorrhea  Short prednisone burst prescribed  Pt requesting pulmonology referral; referral provided  Roddy Severance will be speaking with her workplace about obtaining [facility] paperwork for intermittent leave for asthma  Follow up in 2 weeks for recheck and annual physical         Diagnoses and all orders for this visit:    Encounter for hepatitis C screening test for low risk patient  -     Hepatitis C antibody; Future    Moderate persistent asthma without complication  -     fluticasone (FLOVENT HFA) 110 MCG/ACT inhaler; Inhale 2 puffs 2 (two) times a day Rinse mouth after use  -     montelukast (SINGULAIR) 10 mg tablet; Take 1 tablet (10 mg total) by mouth daily at bedtime  -     albuterol (PROVENTIL HFA,VENTOLIN HFA) 90 mcg/act inhaler; Inhale 2 puffs every 6 (six) hours as needed for wheezing or shortness of breath  -     Ambulatory referral to Pulmonology; Future    Asthma due to environmental allergies  -     fluticasone (FLOVENT HFA) 110 MCG/ACT inhaler; Inhale 2 puffs 2 (two) times a day Rinse mouth after use  -     montelukast (SINGULAIR) 10 mg tablet;  Take 1 tablet (10 mg total) by mouth daily at bedtime    Moderate persistent asthma with exacerbation  -     predniSONE 20 mg tablet; Take 2 tablets (40 mg total) by mouth daily for 5 days    Other orders  -     Discontinue: albuterol (PROVENTIL HFA,VENTOLIN HFA) 90 mcg/act inhaler; Inhale 2 puffs every 6 (six) hours as needed          Subjective:      Patient ID: Amber Costello is a 27 y o  female  Shen Fred presents to the office today for evaluation of asthma  She c/o cough, shortness of breath and wheezing  She has frequent daytime asthma symptoms  The current episode started in the past 7 days  The asthma symptoms have been present frequently  The cough is non-productive  Her symptoms are aggravated by dust, occupational exposure and pollens  She denies chest tightness or wheezing  She has been using her Flovent inhaler twice daily and her albuterol inhaler as needed, usually more than once per day, however reports that the inhalers aren't helping her symptoms very much  She admits to waking up with shortness of breath several times per week and using her albuterol inhaler during the night  She is having associated thoracic back pain and rhinorrhea  Subjective fever this morning  She has seen a pulmonologist in the past, however her last visit with them was over one year ago  The following portions of the patient's history were reviewed and updated as appropriate: allergies, current medications, past family history, past medical history, past social history, past surgical history and problem list     Review of Systems   Constitutional: Positive for fever  Negative for diaphoresis  HENT: Positive for postnasal drip and rhinorrhea  Negative for congestion, sore throat and trouble swallowing  Eyes: Negative for pain, redness and itching  Respiratory: Positive for cough, shortness of breath and wheezing  Negative for apnea, choking, chest tightness and stridor  Cardiovascular: Negative for chest pain and palpitations  Gastrointestinal: Negative for abdominal pain     Musculoskeletal: Negative for back pain (thoracic)  Allergic/Immunologic: Positive for environmental allergies  Neurological: Negative for dizziness and headaches  Psychiatric/Behavioral: Positive for sleep disturbance  Objective:      /70 (BP Location: Left arm, Patient Position: Sitting, Cuff Size: Standard)   Pulse 97   Temp 97 8 °F (36 6 °C) (Temporal)   Resp 20   Ht 5' 2" (1 575 m)   Wt 67 1 kg (148 lb)   SpO2 99%   BMI 27 07 kg/m²          Physical Exam  Vitals reviewed  Constitutional:       General: She is not in acute distress  Appearance: Normal appearance  She is not ill-appearing or toxic-appearing  HENT:      Head: Normocephalic and atraumatic  Eyes:      Extraocular Movements: Extraocular movements intact  Conjunctiva/sclera: Conjunctivae normal    Cardiovascular:      Rate and Rhythm: Normal rate and regular rhythm  Pulses: Normal pulses  Heart sounds: Normal heart sounds  No murmur heard  No friction rub  No gallop  Pulmonary:      Effort: Pulmonary effort is normal  No respiratory distress  Breath sounds: Normal breath sounds  No stridor  No wheezing, rhonchi or rales  Chest:      Chest wall: No tenderness  Musculoskeletal:         General: Normal range of motion  Cervical back: Normal range of motion  Right lower leg: No edema  Left lower leg: No edema  Skin:     General: Skin is warm and dry  Neurological:      Mental Status: She is alert and oriented to person, place, and time  Psychiatric:         Mood and Affect: Mood normal          Behavior: Behavior normal          Thought Content:  Thought content normal

## 2021-08-06 NOTE — ASSESSMENT & PLAN NOTE
Unstable  Pt using Flovent BID and albuterol prn with minimal relief of symptoms of SOB, cough, wheezing  +nighttime symptoms several times per week  Pt reports subjective fever this AM and rhinorrhea  +seasonal allergies  She reports that her symptoms are usually worse in the morning and she did not take any medications or inhalers this morning  Exam unremarkable  No wheezing on auscultation of lungs, no respiratory distress noted  Continue Flovent and albuterol  Begin taking montelukast daily again, as well as using flonase nasal spray daily to help with allergic symptoms  Consider that mild URI may also be contributing to exacerbation given subjective fever and rhinorrhea  Short prednisone burst prescribed  Pt requesting pulmonology referral; referral provided  Carroll Bee will be speaking with her workplace about obtaining [facility] paperwork for intermittent leave for asthma       Follow up in 2 weeks for recheck and annual physical

## 2021-08-06 NOTE — LETTER
August 6, 2021     Patient: Michael Beltran   YOB: 1995   Date of Visit: 8/6/2021       To Whom it May Concern:    Michael Beltran is under my professional care  She was seen in my office on 8/6/2021  Please excuse her from work for asthma exacerbation, effective 8/5/21  She may return to work on 8/7/21  If you have any questions or concerns, please don't hesitate to call           Sincerely,          Richard Chadwick PA-C

## 2021-08-16 ENCOUNTER — APPOINTMENT (OUTPATIENT)
Dept: LAB | Facility: CLINIC | Age: 26
End: 2021-08-16
Payer: COMMERCIAL

## 2021-08-16 DIAGNOSIS — K92.1 MELENA: ICD-10-CM

## 2021-08-16 DIAGNOSIS — K92.0 HEMATEMESIS WITH NAUSEA: ICD-10-CM

## 2021-08-16 LAB
BASOPHILS # BLD AUTO: 0.02 THOUSANDS/ΜL (ref 0–0.1)
BASOPHILS NFR BLD AUTO: 0 % (ref 0–1)
EOSINOPHIL # BLD AUTO: 0.1 THOUSAND/ΜL (ref 0–0.61)
EOSINOPHIL NFR BLD AUTO: 1 % (ref 0–6)
ERYTHROCYTE [DISTWIDTH] IN BLOOD BY AUTOMATED COUNT: 12.4 % (ref 11.6–15.1)
HCT VFR BLD AUTO: 38.4 % (ref 34.8–46.1)
HGB BLD-MCNC: 12.4 G/DL (ref 11.5–15.4)
IMM GRANULOCYTES # BLD AUTO: 0.02 THOUSAND/UL (ref 0–0.2)
IMM GRANULOCYTES NFR BLD AUTO: 0 % (ref 0–2)
LYMPHOCYTES # BLD AUTO: 2.79 THOUSANDS/ΜL (ref 0.6–4.47)
LYMPHOCYTES NFR BLD AUTO: 38 % (ref 14–44)
MCH RBC QN AUTO: 26.8 PG (ref 26.8–34.3)
MCHC RBC AUTO-ENTMCNC: 32.3 G/DL (ref 31.4–37.4)
MCV RBC AUTO: 83 FL (ref 82–98)
MONOCYTES # BLD AUTO: 0.47 THOUSAND/ΜL (ref 0.17–1.22)
MONOCYTES NFR BLD AUTO: 6 % (ref 4–12)
NEUTROPHILS # BLD AUTO: 3.99 THOUSANDS/ΜL (ref 1.85–7.62)
NEUTS SEG NFR BLD AUTO: 55 % (ref 43–75)
NRBC BLD AUTO-RTO: 0 /100 WBCS
PLATELET # BLD AUTO: 309 THOUSANDS/UL (ref 149–390)
PMV BLD AUTO: 10.6 FL (ref 8.9–12.7)
RBC # BLD AUTO: 4.63 MILLION/UL (ref 3.81–5.12)
WBC # BLD AUTO: 7.39 THOUSAND/UL (ref 4.31–10.16)

## 2021-08-16 PROCEDURE — 85025 COMPLETE CBC W/AUTO DIFF WBC: CPT

## 2021-08-16 PROCEDURE — 36415 COLL VENOUS BLD VENIPUNCTURE: CPT

## 2021-09-15 ENCOUNTER — OFFICE VISIT (OUTPATIENT)
Dept: PULMONOLOGY | Facility: CLINIC | Age: 26
End: 2021-09-15
Payer: COMMERCIAL

## 2021-09-15 VITALS
OXYGEN SATURATION: 97 % | HEIGHT: 62 IN | BODY MASS INDEX: 26.5 KG/M2 | RESPIRATION RATE: 18 BRPM | WEIGHT: 144 LBS | SYSTOLIC BLOOD PRESSURE: 118 MMHG | DIASTOLIC BLOOD PRESSURE: 60 MMHG | HEART RATE: 99 BPM

## 2021-09-15 DIAGNOSIS — J30.1 SEASONAL ALLERGIC RHINITIS DUE TO POLLEN: ICD-10-CM

## 2021-09-15 DIAGNOSIS — J45.40 MODERATE PERSISTENT ASTHMA WITHOUT COMPLICATION: Primary | ICD-10-CM

## 2021-09-15 PROCEDURE — 3008F BODY MASS INDEX DOCD: CPT | Performed by: INTERNAL MEDICINE

## 2021-09-15 PROCEDURE — 99214 OFFICE O/P EST MOD 30 MIN: CPT | Performed by: INTERNAL MEDICINE

## 2021-09-15 PROCEDURE — 94010 BREATHING CAPACITY TEST: CPT | Performed by: INTERNAL MEDICINE

## 2021-09-15 PROCEDURE — 1036F TOBACCO NON-USER: CPT | Performed by: INTERNAL MEDICINE

## 2021-09-15 NOTE — ASSESSMENT & PLAN NOTE
-  Symptoms are improved  No longer using nasal spray  Resume as needed  Consider over-the-counter allergy medication as needed as well

## 2021-09-15 NOTE — ASSESSMENT & PLAN NOTE
Unclear as symptoms are solely due to asthma  Suspect she also has element of deconditioning  Which make surrounding difficult     -Office spirometry is normal     - will trial to increase her inhaler therapy  Discontinue fluticasone inhaler and start Breo  Provided with samples and I demonstrated proper inhaler technique     -continue to use albuterol as needed  -  If no improvement with escalation of inhaler therapy, will check echocardiogram   -Avoid triggers as able  Recommend wearing mask at work  -Received COVID vaccine  - declines flu shot today

## 2021-09-15 NOTE — PROGRESS NOTES
Pulmonary Follow Up Note   Yang Dsouza 27 y o  female MRN: [id number]  9/15/2021      Referring provider: Traci Bird PA-C    Assessment and Plan: Moderate persistent asthma without complication   Unclear as symptoms are solely due to asthma  Suspect she also has element of deconditioning  Which make surrounding difficult     -Office spirometry is normal     - will trial to increase her inhaler therapy  Discontinue fluticasone inhaler and start Breo  Provided with samples and I demonstrated proper inhaler technique     -continue to use albuterol as needed  -  If no improvement with escalation of inhaler therapy, will check echocardiogram   -Avoid triggers as able  Recommend wearing mask at work  -Received COVID vaccine  - declines flu shot today  Seasonal allergic rhinitis due to pollen  -  Symptoms are improved  No longer using nasal spray  Resume as needed  Consider over-the-counter allergy medication as needed as well  Diagnoses and all orders for this visit:    Moderate persistent asthma without complication  -     Ambulatory referral to Pulmonology  -     POCT spirometry    Seasonal allergic rhinitis due to pollen      Discussed with patient at length  Questions addressed  I would like her to return for follow-up in 4-6 weeks to evaluate symptoms  History of Present Illness   HPI:  Yang Dsouza is a 27 y o  female who presents for follow-up of asthma  She was last seen by our practice in May 2020  Since then, she states that her asthma is bothering her  She can not run because of her asthma  States that she had COVID one year ago and her asthma has been bothering her since then  Has shortness of breath with exertion  Recently at her primary care doctor's office in early August with complaints of cough shortness of breath and wheezing  Given course of steroids  Singulair was added  Maintained on Flovent nasal spray, Flovent and albuterol inhalers       Uses albuterol inhaler as needed, which is about five times per week  Denies nocturnal symptoms  Uses flovent inhaler twice daily  Does not rinse mouth after use  Not using Flonase nasal spray because states she no longer has allergic rhinitis  Trialed singulair but did not find it helpful  Diagnosed with asthma at age 17  Has been hospitalized in [place] in past but never intubated  Works in [employer]  It is jolene  Makes breathing worse  Never smoker  No secondhand smoke  Dog - does not affect breathing  Denies symptoms of GERD  Review of Systems  Positive as mentioned above and negative otherwise    Historical Information   Past Medical History:   Diagnosis Date    Asthma     in early childhood    COVID-19 2020    Fatty liver     H  pylori infection 09/2020    H  pylori infection 05/2021    Hepatic steatosis     Prediabetes 2019    started tx with Metformin 7/2019    SARS-associated coronavirus infection 5/15/2020     Past Surgical History:   Procedure Laterality Date    EGD  08/2021    esophagitis, gastritis    Biopsies positive for H  pylori     Family History   Problem Relation Age of Onset    No Known Problems Mother     No Known Problems Father     No Known Problems Sister     No Known Problems Maternal Grandmother     No Known Problems Maternal Grandfather     No Known Problems Paternal Grandmother     No Known Problems Paternal Grandfather     No Known Problems Sister     Breast cancer Neg Hx     Cancer Neg Hx          Meds/Allergies     Current Outpatient Medications:     acetaminophen (TYLENOL) 325 mg tablet, Take 650 mg by mouth every 6 (six) hours as needed for mild pain, Disp: , Rfl:     albuterol (PROVENTIL HFA,VENTOLIN HFA) 90 mcg/act inhaler, Inhale 2 puffs every 6 (six) hours as needed for wheezing or shortness of breath, Disp: 6 7 g, Rfl: 3    azelastine (OPTIVAR) 0 05 % ophthalmic solution, Administer 1 drop to both eyes 2 (two) times a day (Patient not taking: Reported on 1/18/2021), Disp: 6 mL, Rfl: 0    fluticasone (FLONASE) 50 mcg/act nasal spray, 2 sprays into each nostril daily (Patient not taking: Reported on 8/16/2021), Disp: 16 g, Rfl: 0    fluticasone (FLOVENT HFA) 110 MCG/ACT inhaler, Inhale 2 puffs 2 (two) times a day Rinse mouth after use , Disp: 12 g, Rfl: 3    levonorgestrel-ethinyl estradiol (Lessina) 0 1-20 MG-MCG per tablet, Take 20 mcg by mouth daily, Disp: , Rfl:     metFORMIN (GLUCOPHAGE) 500 mg tablet, Take 500 mg by mouth daily with breakfast (Patient not taking: Reported on 8/6/2021), Disp: , Rfl:     metroNIDAZOLE (METROGEL) 0 75 % vaginal gel, ONE APPLICATOR PER VAGINA TWICE WEEKLY FOR 5 WEEKS (Patient not taking: Reported on 8/16/2021), Disp: , Rfl:     metroNIDAZOLE (METROGEL) 0 75 % vaginal gel, One applicator per vagina twice weekly for 5 weeks (Patient not taking: Reported on 8/6/2021), Disp: , Rfl:     montelukast (SINGULAIR) 10 mg tablet, Take 1 tablet (10 mg total) by mouth daily at bedtime, Disp: 90 tablet, Rfl: 3    nystatin-triamcinolone (MYCOLOG-II) ointment, Apply topically 2 (two) times a day (Patient not taking: Reported on 8/6/2021), Disp: , Rfl:     omeprazole (PriLOSEC) 20 mg delayed release capsule, Take 1 capsule (20 mg total) by mouth 2 (two) times a day for 14 days, Disp: 28 capsule, Rfl: 0    ondansetron (ZOFRAN-ODT) 4 mg disintegrating tablet, DISSOLVE 1 TABLET BY MOUTH ON TONGUE EVERY 8 HOURS AS NEEDED FOR NAUSEA AND VOMITING, Disp: , Rfl:     pantoprazole (PROTONIX) 40 mg tablet, TAKE 1 TABLET BY MOUTH EVERY DAY WITH OR WITHOUT FOOD, Disp: , Rfl:   No Known Allergies    Vitals:  Vitals:    09/15/21 1455   BP: 118/60   Pulse: 99   Resp: 18   SpO2: 97%   Weight: 65 3 kg (144 lb)   Height: 5' 2" (1 575 m)         Physical Exam   GEN: WDWN, nad, comfortable  HEENT: NCAT, EOMI  CVS: regular, no m/r/g  LUNGS: CTA b/l, no wheezing  ABD: soft  EXT: No c/c/e  NEURO: No focal deficits  MS: Moving all extremities  SKIN: warm, dry  PSYCH: calm, cooperative      Labs: I have personally reviewed pertinent lab results  Lab Results   Component Value Date    WBC 7 39 08/16/2021    HGB 12 4 08/16/2021    HCT 38 4 08/16/2021    MCV 83 08/16/2021     08/16/2021     Lab Results   Component Value Date    CALCIUM 9 1 04/24/2021    K 3 7 04/24/2021    CO2 28 04/24/2021     04/24/2021    BUN 13 04/24/2021    CREATININE 0 84 04/24/2021     No results found for: IGE  Lab Results   Component Value Date    ALT 32 04/24/2021    AST 24 04/24/2021    ALKPHOS 40 (L) 04/24/2021     Labs per my interpretation show normal hemoglobin, normal renal function    Imaging and other studies: I have personally reviewed pertinent films in PACS    May 2020: Clear bilateral lungs    ALYSSA Owens [facility]

## 2022-01-18 ENCOUNTER — OFFICE VISIT (OUTPATIENT)
Dept: DENTISTRY | Facility: CLINIC | Age: 27
End: 2022-01-18

## 2022-01-18 VITALS — DIASTOLIC BLOOD PRESSURE: 85 MMHG | HEART RATE: 108 BPM | TEMPERATURE: 97.1 F | SYSTOLIC BLOOD PRESSURE: 134 MMHG

## 2022-01-18 DIAGNOSIS — Z01.20 ENCOUNTER FOR DENTAL EXAMINATION: Primary | ICD-10-CM

## 2022-01-18 PROCEDURE — D0220 INTRAORAL - PERIAPICAL FIRST RADIOGRAPHIC IMAGE: HCPCS | Performed by: DENTIST

## 2022-01-18 PROCEDURE — D0140 LIMITED ORAL EVALUATION - PROBLEM FOCUSED: HCPCS | Performed by: DENTIST

## 2022-01-18 NOTE — PROGRESS NOTES
PPTC for a RENÉ with pain in the LL region  Pt points to the last tooth in the mouth  Reports pain on eating and biting  PAN from 2019 shows bony impacted #17 with pathology developing at the apex  #32 is also bony impacted but pt doesn't report symptoms in LR area  IOE: no intraoral or extraoral swelling noted  No inflammation or pericoronitis detected in the LL region  Pt doesn't report difficulty breathing or swallowing  Recommended pt see OMFS as soon as possible for extraction of #1,16,17,32 with sedation  Discussed with pt to not wait to see OMFS due to developing pathology around apex of #17 and possible area for infectoin  Pt understood recommendations  OMFS referral created, printed, and given to pt along with PAN radiograph      Dr Tacho Nguyen    NV: comp/FMX

## 2022-05-25 ENCOUNTER — HOSPITAL ENCOUNTER (EMERGENCY)
Facility: HOSPITAL | Age: 27
Discharge: HOME | End: 2022-05-25
Attending: EMERGENCY MEDICINE | Admitting: EMERGENCY MEDICINE
Payer: COMMERCIAL

## 2022-05-25 VITALS
DIASTOLIC BLOOD PRESSURE: 71 MMHG | SYSTOLIC BLOOD PRESSURE: 113 MMHG | OXYGEN SATURATION: 100 % | HEART RATE: 96 BPM | TEMPERATURE: 99 F | WEIGHT: 145.94 LBS | RESPIRATION RATE: 16 BRPM | BODY MASS INDEX: 26.69 KG/M2

## 2022-05-25 DIAGNOSIS — B34.9 VIRAL SYNDROME: Primary | ICD-10-CM

## 2022-05-25 PROCEDURE — 87636 SARSCOV2 & INF A&B AMP PRB: CPT | Performed by: EMERGENCY MEDICINE

## 2022-05-25 PROCEDURE — 99283 EMERGENCY DEPT VISIT LOW MDM: CPT

## 2022-05-25 PROCEDURE — 99284 EMERGENCY DEPT VISIT MOD MDM: CPT | Performed by: EMERGENCY MEDICINE

## 2022-05-25 RX ORDER — IBUPROFEN 600 MG/1
600 TABLET ORAL ONCE
Status: COMPLETED | OUTPATIENT
Start: 2022-05-25 | End: 2022-05-25

## 2022-05-25 RX ORDER — ALBUTEROL SULFATE 90 UG/1
2 AEROSOL, METERED RESPIRATORY (INHALATION) ONCE
Status: COMPLETED | OUTPATIENT
Start: 2022-05-25 | End: 2022-05-25

## 2022-05-25 RX ADMIN — ALBUTEROL SULFATE 2 PUFF: 90 AEROSOL, METERED RESPIRATORY (INHALATION) at 20:17

## 2022-05-25 RX ADMIN — IBUPROFEN 600 MG: 600 TABLET ORAL at 20:17

## 2022-05-25 NOTE — Clinical Note
[facility] our emergency department on 5/25/2022  No restrictions            Diagnosis:     Paradise Mcconnell  may return to work on return date  She may return on this date: 05/29/2022    This patient has a COVID result in their [employer] roxie  If this result is positive she needs to quarantine until the date listed above  If you have any questions or concerns, please don't hesitate to call        Jeannie Callahan MD    ______________________________           _______________          _______________  Hospital Representative                              Date                                Time

## 2022-05-25 NOTE — Clinical Note
Srikanth Braun was seen and treated in our emergency department on 5/25/2022  No restrictions            Diagnosis:     Kendal Solis  may return to work on return date  She may return on this date: 05/29/2022    This patient has a COVID result in their [employer] roxie  If this result is positive she needs to quarantine until the date listed above  Novel Coronavirus cannot be ruled out based on laboratory testing  Current recommendations are that this patient can return to work on the above date if her symptoms have improved and if she has not had a fever for 24 hours without the use of medications (ie, acetaminophen, ibuprofen)  If you have any questions or concerns, please don't hesitate to call        Chano Morales MD    ______________________________           _______________          _______________  Hospital Representative                              Date                                Time

## 2022-05-26 LAB
FLUAV RNA RESP QL NAA+PROBE: NEGATIVE
FLUBV RNA RESP QL NAA+PROBE: NEGATIVE
SARS-COV-2 RNA RESP QL NAA+PROBE: NEGATIVE

## 2022-05-26 NOTE — DISCHARGE INSTRUCTIONS
If you do not have the [employer] roxie for you phone, download this to receive the result of your COVID test  Instructions for downloading this roxie are included in these discharge notes and a code is supplied to set up an account  Due to the high numbers being tested, there can be a significant delay in the time to test result  Do not call in as this will not speed up the process and is disruptive to patient care  You will be alerted when the test result is available  Tell you employer/school you are waiting on COVID testing and can not return until this has resulted  You have been provided with a note for work/school that refers to the test result on Mychart, should this be positive you can return to work/school on the date provided in the note  Si no tiene la [employer]'s Sponge para silva teléfono, descárguela para recibir el resultado de silva prueba COVID  Las instrucciones para descargar esta aplicación se incluyen en estas notas de aleksandr y se proporciona un código para configurar natty [employer]  Debido a la gran cantidad de pruebas que se están realizando, puede cherelle natty demora significativa en el tiempo para probar el resultado  No llame, ya que esto no acelerará el proceso y perjudicará la atención del Gloria  Se le avisará cuando el resultado de la prueba esté disponible  Dígale a silva empleador / Tato Tammy que está esperando la prueba COVID y que no puede regresar hasta que esto haya resultado  Se le ha proporcionado natty nota para el trabajo o la escuela que se refiere al resultado de la prueba en Mychart; si es positivo, puede regresar al Basil Winter o la escuela en la fecha indicada en la nota

## 2022-05-26 NOTE — ED PROVIDER NOTES
HPI: Patient is a 27 y o  female who presents with 2 days of cough, congestion, fatigue which the patient describes at mild The patient has had contact with people with similar symptoms  The patient has not taken any medication  No Known Allergies    Past Medical History:   Diagnosis Date    Asthma     in early childhood    COVID-19 2020    Fatty liver     H  pylori infection 09/2020    H  pylori infection 05/2021    Hepatic steatosis     Prediabetes 2019    started tx with Metformin 7/2019    SARS-associated coronavirus infection 5/15/2020      Past Surgical History:   Procedure Laterality Date    EGD  08/2021    esophagitis, gastritis  Biopsies positive for H  pylori     Social History     Tobacco Use    Smoking status: Never Smoker    Smokeless tobacco: Never Used   Substance Use Topics    Alcohol use: Not Currently    Drug use: Never       Nursing notes reviewed  Physical Exam:  ED Triage Vitals   Temperature Pulse Respirations Blood Pressure SpO2   05/25/22 1943 05/25/22 1943 05/25/22 1943 05/25/22 1945 05/25/22 1943   99 °F (37 2 °C) 96 16 113/71 100 %      Temp Source Heart Rate Source Patient Position - Orthostatic VS BP Location FiO2 (%)   05/25/22 1943 05/25/22 1943 05/25/22 1943 05/25/22 1943 --   Oral Monitor Sitting Right arm       Pain Score       --                  ROS: Positive for cough, congestion, fatigue, the remainder of a 10 organ system ROS was otherwise unremarkable    General: awake, alert, no acute distress    Head: normocephalic, atraumatic    Eyes: no scleral icterus  Ears: external ears normal, hearing grossly intact  Nose: external exam grossly normal, negative nasal discharge  Neck: symmetric, No JVD noted, trachea midline  Pulmonary: no respiratory distress, no tachypnea noted  Cardiovascular: appears well perfused  Abdomen: no distention noted  Musculoskeletal: no deformities noted, tone normal  Neuro: grossly non-focal  Psych: mood and affect appropriate    The patient is stable and has a history and physical exam consistent with a viral illness  COVID19 testing has been performed  I considered the patient's other medical conditions as applicable/noted above in my medical decision making  The patient is stable upon discharge  The plan is for supportive care at home  The patient (and any family present) verbalized understanding of the discharge instructions and warnings that would necessitate return to the Emergency Department  All questions were answered prior to discharge  Medications   ibuprofen (MOTRIN) tablet 600 mg (600 mg Oral Given 5/25/22 2017)   albuterol (PROVENTIL HFA,VENTOLIN HFA) inhaler 2 puff (2 puffs Inhalation Given 5/25/22 2017)     Final diagnoses:   Viral syndrome     Time reflects when diagnosis was documented in both MDM as applicable and the Disposition within this note     Time User Action Codes Description Comment    5/25/2022  8:09 PM Valentine Ortega [B34 9] Viral syndrome       ED Disposition     ED Disposition   Discharge    Condition   Stable    Date/Time   Wed May 25, 2022  8:09 PM    Comment   Loida Orellana discharge to home/self care                 Follow-up Information    None       Discharge Medication List as of 5/25/2022  8:11 PM      CONTINUE these medications which have NOT CHANGED    Details   acetaminophen (TYLENOL) 325 mg tablet Take 650 mg by mouth every 6 (six) hours as needed for mild pain, Historical Med      albuterol (PROVENTIL HFA,VENTOLIN HFA) 90 mcg/act inhaler Inhale 2 puffs every 6 (six) hours as needed for wheezing or shortness of breath, Starting Fri 8/6/2021, Normal      fluticasone (FLOVENT HFA) 110 MCG/ACT inhaler Inhale 2 puffs 2 (two) times a day Rinse mouth after use , Starting Fri 8/6/2021, Normal      levonorgestrel-ethinyl estradiol (Lessina) 0 1-20 MG-MCG per tablet Take 20 mcg by mouth daily, Starting Mon 4/20/2020, Until Fri 9/3/2021, Historical Med      metroNIDAZOLE (METROGEL) 0 75 % vaginal gel One applicator per vagina twice weekly for 5 weeks, Historical Med      montelukast (SINGULAIR) 10 mg tablet Take 1 tablet (10 mg total) by mouth daily at bedtime, Starting Fri 8/6/2021, Normal      omeprazole (PriLOSEC) 20 mg delayed release capsule Take 1 capsule (20 mg total) by mouth 2 (two) times a day for 14 days, Starting Tue 5/11/2021, Until Tue 5/25/2021, Normal      pantoprazole (PROTONIX) 40 mg tablet TAKE 1 TABLET BY MOUTH EVERY DAY WITH OR WITHOUT FOOD, Historical Med           No discharge procedures on file      Electronically Signed by      Lynne Sandra MD  05/25/22 6677

## 2022-11-09 ENCOUNTER — OFFICE VISIT (OUTPATIENT)
Dept: OBGYN CLINIC | Facility: CLINIC | Age: 27
End: 2022-11-09

## 2022-11-09 VITALS
BODY MASS INDEX: 27.18 KG/M2 | WEIGHT: 148.6 LBS | HEART RATE: 92 BPM | DIASTOLIC BLOOD PRESSURE: 84 MMHG | SYSTOLIC BLOOD PRESSURE: 127 MMHG

## 2022-11-09 DIAGNOSIS — R10.2 PELVIC PAIN: Primary | ICD-10-CM

## 2022-11-09 DIAGNOSIS — N92.6 MISSED MENSES: ICD-10-CM

## 2022-11-09 PROBLEM — R73.9 HYPERGLYCEMIA: Status: RESOLVED | Noted: 2020-07-06 | Resolved: 2022-11-09

## 2022-11-09 PROBLEM — J30.1 SEASONAL ALLERGIC RHINITIS DUE TO POLLEN: Status: RESOLVED | Noted: 2021-06-09 | Resolved: 2022-11-09

## 2022-11-09 PROBLEM — N63.0 BREAST LUMP IN FEMALE: Status: RESOLVED | Noted: 2021-04-19 | Resolved: 2022-11-09

## 2022-11-09 PROBLEM — J45.40 MODERATE PERSISTENT ASTHMA WITHOUT COMPLICATION: Status: RESOLVED | Noted: 2020-05-21 | Resolved: 2022-11-09

## 2022-11-09 PROBLEM — R10.9 RECURRENT ABDOMINAL PAIN: Status: RESOLVED | Noted: 2021-04-19 | Resolved: 2022-11-09

## 2022-11-09 PROBLEM — E78.2 MIXED HYPERLIPIDEMIA: Status: RESOLVED | Noted: 2021-04-19 | Resolved: 2022-11-09

## 2022-11-09 LAB
SL AMB  POCT GLUCOSE, UA: NORMAL
SL AMB LEUKOCYTE ESTERASE,UA: NORMAL
SL AMB POCT BILIRUBIN,UA: NORMAL
SL AMB POCT BLOOD,UA: NORMAL
SL AMB POCT CLARITY,UA: CLEAR
SL AMB POCT COLOR,UA: YELLOW
SL AMB POCT KETONES,UA: NORMAL
SL AMB POCT NITRITE,UA: NORMAL
SL AMB POCT PH,UA: 5
SL AMB POCT SPECIFIC GRAVITY,UA: 1.02
SL AMB POCT URINE HCG: NEGATIVE
SL AMB POCT URINE PROTEIN: NORMAL
SL AMB POCT UROBILINOGEN: 0.2

## 2022-11-09 NOTE — LETTER
11/10/2022    To Praveen Doran  : 1995      Esta carta es para informarle que kb CULTURAS recientes para la gonorrea y la clamidia fueron revisadas por mí y son Gavin Poster    Comuníquese con la oficina para natty parisa si tiene alguna inquietud Karime Daily, VIRAJ

## 2022-11-09 NOTE — PATIENT INSTRUCTIONS
Jm por silva confianza en nuestro equipo  Dashawn Reddish y agradecemos kb comentarios  Si recibe natty encuesta nuestra, tómese unos momentos para informarnos cómo estamos     Sinceramente,  Kyra Baez

## 2022-11-09 NOTE — PROGRESS NOTES
PROBLEM GYNECOLOGICAL VISIT    Maude Roblero is a 24 y o  female who presents today with complaint of pelvic pain  Her general medical history has been reviewed and she reports it as follows:    Past Medical History:   Diagnosis Date   • Asthma     in early childhood   • Chlamydia    • Fatty liver    • H  pylori infection     ,    • Prediabetes      Past Surgical History:   Procedure Laterality Date   • OVARIAN CYST REMOVAL Right      OB History        0    Para   0    Term   0       0    AB   0    Living   0       SAB   0    IAB   0    Ectopic   0    Multiple   0    Live Births   0               Social History     Tobacco Use   • Smoking status: Never Smoker   • Smokeless tobacco: Never Used   Vaping Use   • Vaping Use: Never used   Substance Use Topics   • Alcohol use: Yes     Comment: couple times/year   • Drug use: Never     Social History     Substance and Sexual Activity   Sexual Activity Yes   • Partners: Male   • Birth control/protection: Condom Male       Current Outpatient Medications   Medication Instructions   • albuterol (PROVENTIL HFA,VENTOLIN HFA) 90 mcg/act inhaler 2 puffs, Inhalation, Every 6 hours PRN   • fluticasone (FLOVENT HFA) 110 MCG/ACT inhaler 2 puffs, Inhalation, 2 times daily, Rinse mouth after use  History of Present Illness:   Reports bilateral pelvic pain since R ovarian cystectomy in 2019  Reports the pain is constant although some days is more intense than other days  She was seeing OBGYN at [facility] with this problem and pelvic ultrasound was performed 3/24/2022 and was unremarkable  She was treated with OCP's for a few months but it did not alleviate pelvic pain  She reports menses have always been irregular - approximately every 1-2 months - and there are no recent changes in her menses, with typical duration of menses of 5 days and mild to moderate flow  Denies dysuria  Reports often has dyspareunia w/deep thrusting      Review of Systems:  Review of Systems   Constitutional: Negative  Gastrointestinal: Negative  Genitourinary: Positive for dyspareunia and pelvic pain  Negative for dysuria, menstrual problem, vaginal discharge and vaginal pain  Physical Exam:  /84   Pulse 92   Wt 67 4 kg (148 lb 9 6 oz)   LMP 10/02/2022 (Exact Date)   BMI 27 18 kg/m²   Physical Exam  Constitutional:       General: She is not in acute distress  Abdominal:      Palpations: Abdomen is soft  Comments: Minimal tenderness noted bilateral pelvis   Neurological:      Mental Status: She is alert  Skin:     General: Skin is warm and dry  Vitals reviewed  Point of Care Testing:   -urine pregnancy test: negative   -urinalysis: neg leuks, neg nitrites, neg blood    Assessment:   1  Pelvic pain  Plan:   1  Cultures ordered: GC/CT  2  Imaging ordered: pelvic ultrasound  3  Return to office 1 week after ultrasound to review results  4  Patient's depression screening was assessed with a PHQ-2 score of 0  Their PHQ-9 score was 1  Clinically patient does not have depression  No treatment is required  Reviewed with patient that test results are available in [facility] immediately, but that they will not necessarily be reviewed by me immediately  Explained that I will review results at my earliest opportunity and contact patient appropriately

## 2022-11-10 LAB
C TRACH DNA SPEC QL NAA+PROBE: NEGATIVE
N GONORRHOEA DNA SPEC QL NAA+PROBE: NEGATIVE

## 2022-11-19 ENCOUNTER — HOSPITAL ENCOUNTER (OUTPATIENT)
Dept: ULTRASOUND IMAGING | Facility: HOSPITAL | Age: 27
Discharge: HOME | End: 2022-11-19
Attending: NURSE PRACTITIONER

## 2022-11-19 DIAGNOSIS — R10.2 PELVIC PAIN: ICD-10-CM

## 2022-11-22 ENCOUNTER — OFFICE VISIT (OUTPATIENT)
Dept: OBGYN CLINIC | Facility: CLINIC | Age: 27
End: 2022-11-22

## 2022-11-22 VITALS
WEIGHT: 151.2 LBS | SYSTOLIC BLOOD PRESSURE: 120 MMHG | BODY MASS INDEX: 27.82 KG/M2 | HEIGHT: 62 IN | HEART RATE: 83 BPM | DIASTOLIC BLOOD PRESSURE: 79 MMHG

## 2022-11-22 DIAGNOSIS — Z71.2 ENCOUNTER TO DISCUSS TEST RESULTS: Primary | ICD-10-CM

## 2022-11-22 NOTE — PROGRESS NOTES
Nas Cuadra presents today to review results of pelvic ultrasound performed 11/19/22  However, the results are still pending and not available for me to review  Advised her to return next week to discuss

## 2022-11-29 ENCOUNTER — OFFICE VISIT (OUTPATIENT)
Dept: OBGYN CLINIC | Facility: CLINIC | Age: 27
End: 2022-11-29

## 2022-11-29 VITALS
WEIGHT: 148.2 LBS | BODY MASS INDEX: 27.27 KG/M2 | DIASTOLIC BLOOD PRESSURE: 80 MMHG | HEIGHT: 62 IN | SYSTOLIC BLOOD PRESSURE: 120 MMHG | HEART RATE: 87 BPM

## 2022-11-29 DIAGNOSIS — Z71.2 ENCOUNTER TO DISCUSS TEST RESULTS: Primary | ICD-10-CM

## 2022-11-29 DIAGNOSIS — B37.31 VAGINAL CANDIDIASIS: ICD-10-CM

## 2022-11-29 DIAGNOSIS — R10.2 PELVIC PAIN: ICD-10-CM

## 2022-11-29 RX ORDER — FLUCONAZOLE 150 MG/1
150 TABLET ORAL ONCE
Qty: 1 TABLET | Refills: 0 | Status: SHIPPED | OUTPATIENT
Start: 2022-11-29 | End: 2022-11-29

## 2022-11-29 NOTE — PROGRESS NOTES
Jerry Hodgkin presents today to review results of pelvic ultrasound performed 11/19/2022  Explained that ultrasound was essentially normal - possible adenomyosis was noted, but given pt's history of OCP use that did NOT alleviate pain, then both adenomyosis and endometriosis are doubtful diagnoses  Patient endorses that OCP use was NOT helpful  Offered Pelvic Floor Physical Therapy  Patient desires to try it  Order placed  Also reports vaginal itching and discharge for past week  Given Rx Diflucan    Return in 2 months for annual GYN exam

## 2022-12-08 ENCOUNTER — HOSPITAL ENCOUNTER (EMERGENCY)
Facility: HOSPITAL | Age: 27
Discharge: HOME | End: 2022-12-08
Attending: EMERGENCY MEDICINE

## 2022-12-08 VITALS
HEART RATE: 93 BPM | TEMPERATURE: 98.1 F | BODY MASS INDEX: 27.78 KG/M2 | WEIGHT: 151.9 LBS | SYSTOLIC BLOOD PRESSURE: 113 MMHG | DIASTOLIC BLOOD PRESSURE: 80 MMHG | OXYGEN SATURATION: 100 % | RESPIRATION RATE: 17 BRPM

## 2022-12-08 DIAGNOSIS — R11.2 NAUSEA AND VOMITING: ICD-10-CM

## 2022-12-08 DIAGNOSIS — R10.13 ACUTE EPIGASTRIC PAIN: Primary | ICD-10-CM

## 2022-12-08 LAB
ALBUMIN SERPL BCP-MCNC: 4 G/DL (ref 3.5–5)
ALP SERPL-CCNC: 56 U/L (ref 46–116)
ALT SERPL W P-5'-P-CCNC: 52 U/L (ref 12–78)
ANION GAP SERPL CALCULATED.3IONS-SCNC: 9 MMOL/L (ref 4–13)
AST SERPL W P-5'-P-CCNC: 33 U/L (ref 5–45)
BASOPHILS # BLD AUTO: 0.03 THOUSANDS/ÂΜL (ref 0–0.1)
BASOPHILS NFR BLD AUTO: 0 % (ref 0–1)
BILIRUB SERPL-MCNC: 0.23 MG/DL (ref 0.2–1)
BILIRUB UR QL STRIP: NEGATIVE
BUN SERPL-MCNC: 12 MG/DL (ref 5–25)
CALCIUM SERPL-MCNC: 8.4 MG/DL (ref 8.3–10.1)
CHLORIDE SERPL-SCNC: 104 MMOL/L (ref 96–108)
CLARITY UR: CLEAR
CO2 SERPL-SCNC: 27 MMOL/L (ref 21–32)
COLOR UR: YELLOW
CREAT SERPL-MCNC: 0.75 MG/DL (ref 0.6–1.3)
EOSINOPHIL # BLD AUTO: 0.1 THOUSAND/ÂΜL (ref 0–0.61)
EOSINOPHIL NFR BLD AUTO: 1 % (ref 0–6)
ERYTHROCYTE [DISTWIDTH] IN BLOOD BY AUTOMATED COUNT: 12.1 % (ref 11.6–15.1)
EXT PREGNANCY TEST URINE: NEGATIVE
EXT. CONTROL: NORMAL
GFR SERPL CREATININE-BSD FRML MDRD: 111 ML/MIN/1.73SQ M
GLUCOSE SERPL-MCNC: 102 MG/DL (ref 65–140)
GLUCOSE UR STRIP-MCNC: NEGATIVE MG/DL
HCT VFR BLD AUTO: 38.7 % (ref 34.8–46.1)
HGB BLD-MCNC: 12.8 G/DL (ref 11.5–15.4)
HGB UR QL STRIP.AUTO: NEGATIVE
IMM GRANULOCYTES # BLD AUTO: 0.02 THOUSAND/UL (ref 0–0.2)
IMM GRANULOCYTES NFR BLD AUTO: 0 % (ref 0–2)
KETONES UR STRIP-MCNC: NEGATIVE MG/DL
LEUKOCYTE ESTERASE UR QL STRIP: NEGATIVE
LIPASE SERPL-CCNC: 153 U/L (ref 73–393)
LYMPHOCYTES # BLD AUTO: 3.23 THOUSANDS/ÂΜL (ref 0.6–4.47)
LYMPHOCYTES NFR BLD AUTO: 42 % (ref 14–44)
MCH RBC QN AUTO: 27.3 PG (ref 26.8–34.3)
MCHC RBC AUTO-ENTMCNC: 33.1 G/DL (ref 31.4–37.4)
MCV RBC AUTO: 83 FL (ref 82–98)
MONOCYTES # BLD AUTO: 0.58 THOUSAND/ÂΜL (ref 0.17–1.22)
MONOCYTES NFR BLD AUTO: 8 % (ref 4–12)
NEUTROPHILS # BLD AUTO: 3.78 THOUSANDS/ÂΜL (ref 1.85–7.62)
NEUTS SEG NFR BLD AUTO: 49 % (ref 43–75)
NITRITE UR QL STRIP: NEGATIVE
NRBC BLD AUTO-RTO: 0 /100 WBCS
PH UR STRIP.AUTO: 8.5 [PH] (ref 4.5–8)
PLATELET # BLD AUTO: 283 THOUSANDS/UL (ref 149–390)
PMV BLD AUTO: 9.9 FL (ref 8.9–12.7)
POTASSIUM SERPL-SCNC: 3.4 MMOL/L (ref 3.5–5.3)
PROT SERPL-MCNC: 7.8 G/DL (ref 6.4–8.4)
PROT UR STRIP-MCNC: NEGATIVE MG/DL
RBC # BLD AUTO: 4.69 MILLION/UL (ref 3.81–5.12)
SODIUM SERPL-SCNC: 140 MMOL/L (ref 135–147)
SP GR UR STRIP.AUTO: 1.02 (ref 1–1.03)
UROBILINOGEN UR QL STRIP.AUTO: 0.2 E.U./DL
WBC # BLD AUTO: 7.74 THOUSAND/UL (ref 4.31–10.16)

## 2022-12-08 RX ORDER — KETOROLAC TROMETHAMINE 30 MG/ML
15 INJECTION, SOLUTION INTRAMUSCULAR; INTRAVENOUS ONCE
Status: COMPLETED | OUTPATIENT
Start: 2022-12-08 | End: 2022-12-08

## 2022-12-08 RX ORDER — ONDANSETRON 4 MG/1
4 TABLET, ORALLY DISINTEGRATING ORAL EVERY 6 HOURS PRN
Qty: 20 TABLET | Refills: 0 | Status: SHIPPED | OUTPATIENT
Start: 2022-12-08

## 2022-12-08 RX ORDER — METOCLOPRAMIDE HYDROCHLORIDE 5 MG/ML
10 INJECTION INTRAMUSCULAR; INTRAVENOUS ONCE
Status: COMPLETED | OUTPATIENT
Start: 2022-12-08 | End: 2022-12-08

## 2022-12-08 RX ORDER — MAGNESIUM HYDROXIDE/ALUMINUM HYDROXICE/SIMETHICONE 120; 1200; 1200 MG/30ML; MG/30ML; MG/30ML
30 SUSPENSION ORAL ONCE
Status: COMPLETED | OUTPATIENT
Start: 2022-12-08 | End: 2022-12-08

## 2022-12-08 RX ORDER — SUCRALFATE 1 G/1
1 TABLET ORAL 4 TIMES DAILY
Qty: 20 TABLET | Refills: 0 | Status: SHIPPED | OUTPATIENT
Start: 2022-12-08

## 2022-12-08 RX ORDER — ONDANSETRON 2 MG/ML
4 INJECTION INTRAMUSCULAR; INTRAVENOUS ONCE
Status: COMPLETED | OUTPATIENT
Start: 2022-12-08 | End: 2022-12-08

## 2022-12-08 RX ADMIN — KETOROLAC TROMETHAMINE 15 MG: 30 INJECTION, SOLUTION INTRAMUSCULAR; INTRAVENOUS at 19:07

## 2022-12-08 RX ADMIN — KETOROLAC TROMETHAMINE 15 MG: 30 INJECTION, SOLUTION INTRAMUSCULAR; INTRAVENOUS at 18:12

## 2022-12-08 RX ADMIN — METOCLOPRAMIDE 10 MG: 5 INJECTION, SOLUTION INTRAMUSCULAR; INTRAVENOUS at 19:07

## 2022-12-08 RX ADMIN — ALUMINUM HYDROXIDE, MAGNESIUM HYDROXIDE, AND SIMETHICONE 30 ML: 200; 200; 20 SUSPENSION ORAL at 19:07

## 2022-12-08 RX ADMIN — SODIUM CHLORIDE 1000 ML: 0.9 INJECTION, SOLUTION INTRAVENOUS at 18:11

## 2022-12-08 RX ADMIN — ONDANSETRON 4 MG: 2 INJECTION INTRAMUSCULAR; INTRAVENOUS at 18:12

## 2022-12-08 NOTE — Clinical Note
Martine Conrad was seen and treated in our emergency department on 12/8/2022  Diagnosis:     Tracey Cody  may return to work on return date  She may return on this date: 12/09/2022         If you have any questions or concerns, please don't hesitate to call        [address], DO    ______________________________           _______________          _______________  [facility] Representative                              Date                                Time

## 2022-12-08 NOTE — ED PROVIDER NOTES
History  Chief Complaint   Patient presents with   • Abdominal Pain     Pt reports mid abdominal pain x2 days  Pt reports unable to tolerate food (throws it up0o but is able to tolerate fluids  No urinary symptoms  No fevers  24 y o  F p/w epigastric abd pain x 2 days  Burning  Associated with N/V  History provided by:  Patient   used: No    Abdominal Pain  Pain location:  Epigastric  Pain quality: burning    Pain radiates to:  Does not radiate  Duration:  2 days  Chronicity:  New  Context: not previous surgeries, not recent travel and not sick contacts    Relieved by:  None tried  Worsened by:  Nothing  Ineffective treatments: Pepto Bismol  Associated symptoms: nausea and vomiting    Associated symptoms: no constipation, no diarrhea and no fever        Prior to Admission Medications   Prescriptions Last Dose Informant Patient Reported? Taking? albuterol (PROVENTIL HFA,VENTOLIN HFA) 90 mcg/act inhaler   No No   Sig: Inhale 2 puffs every 6 (six) hours as needed for wheezing or shortness of breath   fluticasone (FLOVENT HFA) 110 MCG/ACT inhaler   No No   Sig: Inhale 2 puffs 2 (two) times a day Rinse mouth after use        Facility-Administered Medications: None       Past Medical History:   Diagnosis Date   • Asthma     in early childhood   • Chlamydia 2020   • Fatty liver    • H  pylori infection     2020, 2021   • Prediabetes        Past Surgical History:   Procedure Laterality Date   • OVARIAN CYST REMOVAL Right 11/20/2019       Family History   Problem Relation Age of Onset   • No Known Problems Mother    • No Known Problems Father    • No Known Problems Sister    • No Known Problems Sister    • No Known Problems Maternal Grandmother    • No Known Problems Maternal Grandfather    • No Known Problems Paternal Grandmother    • No Known Problems Paternal Grandfather    • Breast cancer Neg Hx    • Cancer Neg Hx    • Colon cancer Neg Hx    • Ovarian cancer Neg Hx      I have reviewed and agree with the history as documented  E-Cigarette/Vaping   • E-Cigarette Use Never User      E-Cigarette/Vaping Substances     Social History     Tobacco Use   • Smoking status: Never   • Smokeless tobacco: Never   Vaping Use   • Vaping Use: Never used   Substance Use Topics   • Alcohol use: Yes     Comment: couple times/year   • Drug use: Never       Review of Systems   Constitutional: Negative for fever  Gastrointestinal: Positive for abdominal pain, nausea and vomiting  Negative for constipation and diarrhea  Musculoskeletal: Negative for myalgias  Neurological: Negative for headaches  All other systems reviewed and are negative  Physical Exam  Physical Exam  Vitals and nursing note reviewed  Constitutional:       General: She is not in acute distress  Appearance: Normal appearance  She is well-developed  She is not ill-appearing, toxic-appearing or diaphoretic  HENT:      Head: Normocephalic and atraumatic  Eyes:      General: No scleral icterus  Neck:      Vascular: No JVD  Trachea: Trachea normal    Cardiovascular:      Rate and Rhythm: Normal rate and regular rhythm  Heart sounds: Normal heart sounds  No murmur heard  No friction rub  Pulmonary:      Effort: Pulmonary effort is normal  No accessory muscle usage or respiratory distress  Breath sounds: Normal breath sounds  No stridor  No wheezing, rhonchi or rales  Abdominal:      General: There is no distension  Palpations: Abdomen is soft  Abdomen is not rigid  There is no mass  Tenderness: There is abdominal tenderness in the epigastric area  There is no guarding or rebound  Negative signs include Chu's sign and McBurney's sign  Musculoskeletal:      Cervical back: Normal range of motion  Skin:     General: Skin is warm and dry  Coloration: Skin is not pale  Findings: No rash  Neurological:      Mental Status: She is alert  GCS: GCS eye subscore is 4   GCS verbal subscore is 5  GCS motor subscore is 6     Psychiatric:         Behavior: Behavior normal          Vital Signs  ED Triage Vitals   Temperature Pulse Respirations Blood Pressure SpO2   12/08/22 1633 12/08/22 1630 12/08/22 1630 12/08/22 1630 12/08/22 1630   98 1 °F (36 7 °C) 85 16 121/82 100 %      Temp Source Heart Rate Source Patient Position - Orthostatic VS BP Location FiO2 (%)   12/08/22 1630 12/08/22 1630 12/08/22 1630 12/08/22 1630 --   Oral Monitor Sitting Right arm       Pain Score       12/08/22 1630       8           Vitals:    12/08/22 1630 12/08/22 1929   BP: 121/82 113/80   Pulse: 85 93   Patient Position - Orthostatic VS: Sitting Lying         Visual Acuity      ED Medications  Medications   ondansetron (ZOFRAN) injection 4 mg (4 mg Intravenous Given 12/8/22 1812)   sodium chloride 0 9 % bolus 1,000 mL (0 mL Intravenous Stopped 12/8/22 1915)   ketorolac (TORADOL) injection 15 mg (15 mg Intravenous Given 12/8/22 1812)   metoclopramide (REGLAN) injection 10 mg (10 mg Intravenous Given 12/8/22 1907)   aluminum-magnesium hydroxide-simethicone (MYLANTA) oral suspension 30 mL (30 mL Oral Given 12/8/22 1907)   ketorolac (TORADOL) injection 15 mg (15 mg Intravenous Given 12/8/22 1907)       Diagnostic Studies  Results Reviewed     Procedure Component Value Units Date/Time    POCT pregnancy, urine [[id number]]  (Normal) Resulted: 12/08/22 1755    Lab Status: Final result Specimen: Urine Updated: 12/08/22 1755     EXT Preg Test, Ur Negative     Control Valid    Comprehensive metabolic panel [[id number]]  (Abnormal) Collected: 12/08/22 1727    Lab Status: Final result Specimen: Blood from Arm, Right Updated: 12/08/22 1754     Sodium 140 mmol/L      Potassium 3 4 mmol/L      Chloride 104 mmol/L      CO2 27 mmol/L      ANION GAP 9 mmol/L      BUN 12 mg/dL      Creatinine 0 75 mg/dL      Glucose 102 mg/dL      Calcium 8 4 mg/dL      AST 33 U/L      ALT 52 U/L      Alkaline Phosphatase 56 U/L      Total Protein 7 8 g/dL Albumin 4 0 g/dL      Total Bilirubin 0 23 mg/dL      eGFR 111 ml/min/1 73sq m     Narrative:      [facility] guidelines for Chronic Kidney Disease (CKD):   •  Stage 1 with normal or high GFR (GFR > 90 mL/min/1 73 square meters)  •  Stage 2 Mild CKD (GFR = 60-89 mL/min/1 73 square meters)  •  Stage 3A Moderate CKD (GFR = 45-59 mL/min/1 73 square meters)  •  Stage 3B Moderate CKD (GFR = 30-44 mL/min/1 73 square meters)  •  Stage 4 Severe CKD (GFR = 15-29 mL/min/1 73 square meters)  •  Stage 5 End Stage CKD (GFR <15 mL/min/1 73 square meters)  Note: GFR calculation is accurate only with a steady state creatinine    Lipase [[id number]]  (Normal) Collected: 12/08/22 1727    Lab Status: Final result Specimen: Blood from Arm, Right Updated: 12/08/22 1754     Lipase 153 u/L     Urine Macroscopic, POC [[id number]]  (Abnormal) Collected: 12/08/22 1747    Lab Status: Final result Specimen: Urine Updated: 12/08/22 1749     Color, UA Yellow     Clarity, UA Clear     pH, UA 8 5     Leukocytes, UA Negative     Nitrite, UA Negative     Protein, UA Negative mg/dl      Glucose, UA Negative mg/dl      Ketones, UA Negative mg/dl      Urobilinogen, UA 0 2 E U /dl      Bilirubin, UA Negative     Occult Blood, UA Negative     Specific Gravity, UA 1 020    Narrative:      CLINITEK RESULT    CBC and differential [[id number]] Collected: 12/08/22 1727    Lab Status: Final result Specimen: Blood from Arm, Right Updated: 12/08/22 1736     WBC 7 74 Thousand/uL      RBC 4 69 Million/uL      Hemoglobin 12 8 g/dL      Hematocrit 38 7 %      MCV 83 fL      MCH 27 3 pg      MCHC 33 1 g/dL      RDW 12 1 %      MPV 9 9 fL      Platelets 207 Thousands/uL      nRBC 0 /100 WBCs      Neutrophils Relative 49 %      Immat GRANS % 0 %      Lymphocytes Relative 42 %      Monocytes Relative 8 %      Eosinophils Relative 1 %      Basophils Relative 0 %      Neutrophils Absolute 3 78 Thousands/µL      Immature Grans Absolute 0 02 Thousand/uL Lymphocytes Absolute 3 23 Thousands/µL      Monocytes Absolute 0 58 Thousand/µL      Eosinophils Absolute 0 10 Thousand/µL      Basophils Absolute 0 03 Thousands/µL                  No orders to display              Procedures  Procedures         ED Course  ED Course as of 12/08/22 1954   Thu Dec 08, 2022   1812 Pt given Toradol/Zofran  1852 Pt still feels the same  Maalox and Reglan ordered  1907 Pt given meds  1938 Pt reports feeling better  SBIRT 22yo+    Flowsheet Row Most Recent Value   SBIRT (25 yo +)    In order to provide better care to our patients, we are screening all of our patients for alcohol and drug use  Would it be okay to ask you these screening questions? No Filed at: 12/08/2022 1734                    MDM    Disposition  Final diagnoses:   Acute epigastric pain   Nausea and vomiting     Time reflects when diagnosis was documented in both MDM as applicable and the Disposition within this note     Time User Action Codes Description Comment    12/8/2022  6:13 PM Verónica Farmer 50 [R10 13] Acute epigastric pain     12/8/2022  6:13 PM Verónica Farmer 50 [R11 2] Nausea and vomiting       ED Disposition     ED Disposition   Discharge    Condition   Stable    Date/Time   Thu Dec 8, 2022  7:39 PM    Comment   Prabhjot Falk discharge to home/self care  Follow-up Information    None         Patient's Medications   Discharge Prescriptions    ONDANSETRON (ZOFRAN ODT) 4 MG DISINTEGRATING TABLET    Take 1 tablet (4 mg total) by mouth every 6 (six) hours as needed for nausea or vomiting       Start Date: 12/8/2022 End Date: --       Order Dose: 4 mg       Quantity: 20 tablet    Refills: 0    SUCRALFATE (CARAFATE) 1 G TABLET    Take 1 tablet (1 g total) by mouth 4 (four) times a day       Start Date: 12/8/2022 End Date: --       Order Dose: 1 g       Quantity: 20 tablet    Refills: 0       No discharge procedures on file      PDMP Review     None ED Provider  Electronically Signed by           Ramona [address], DO  12/08/22 1954

## 2023-01-31 ENCOUNTER — TELEPHONE (OUTPATIENT)
Dept: OBGYN CLINIC | Facility: CLINIC | Age: 28
End: 2023-01-31

## 2023-06-26 ENCOUNTER — TELEPHONE (OUTPATIENT)
Dept: OBGYN CLINIC | Facility: CLINIC | Age: 28
End: 2023-06-26

## 2023-08-31 ENCOUNTER — OFFICE VISIT (OUTPATIENT)
Dept: FAMILY MEDICINE CLINIC | Facility: CLINIC | Age: 28
End: 2023-08-31
Payer: COMMERCIAL

## 2023-08-31 VITALS
HEART RATE: 84 BPM | OXYGEN SATURATION: 98 % | DIASTOLIC BLOOD PRESSURE: 74 MMHG | WEIGHT: 147.6 LBS | SYSTOLIC BLOOD PRESSURE: 122 MMHG | BODY MASS INDEX: 27.16 KG/M2 | TEMPERATURE: 97.6 F | HEIGHT: 62 IN

## 2023-08-31 DIAGNOSIS — Z11.3 SCREENING EXAMINATION FOR STD (SEXUALLY TRANSMITTED DISEASE): ICD-10-CM

## 2023-08-31 DIAGNOSIS — L30.9 DERMATITIS: ICD-10-CM

## 2023-08-31 DIAGNOSIS — K21.9 GASTROESOPHAGEAL REFLUX DISEASE, UNSPECIFIED WHETHER ESOPHAGITIS PRESENT: Primary | ICD-10-CM

## 2023-08-31 DIAGNOSIS — L21.9 SEBORRHEIC DERMATITIS OF SCALP: ICD-10-CM

## 2023-08-31 DIAGNOSIS — Z00.00 HEALTHCARE MAINTENANCE: ICD-10-CM

## 2023-08-31 DIAGNOSIS — J45.20 MILD INTERMITTENT ASTHMA WITHOUT COMPLICATION: ICD-10-CM

## 2023-08-31 PROCEDURE — 99214 OFFICE O/P EST MOD 30 MIN: CPT | Performed by: NURSE PRACTITIONER

## 2023-08-31 PROCEDURE — 99395 PREV VISIT EST AGE 18-39: CPT | Performed by: NURSE PRACTITIONER

## 2023-08-31 RX ORDER — KETOCONAZOLE 20 MG/ML
1 SHAMPOO TOPICAL 2 TIMES WEEKLY
Qty: 100 ML | Refills: 0 | Status: SHIPPED | OUTPATIENT
Start: 2023-08-31

## 2023-08-31 RX ORDER — PANTOPRAZOLE SODIUM 40 MG/1
40 TABLET, DELAYED RELEASE ORAL DAILY
COMMUNITY
Start: 2023-02-22 | End: 2024-02-22

## 2023-08-31 RX ORDER — TRIAMCINOLONE ACETONIDE 5 MG/G
CREAM TOPICAL 3 TIMES DAILY
Qty: 15 G | Refills: 1 | Status: SHIPPED | OUTPATIENT
Start: 2023-08-31

## 2023-08-31 NOTE — PROGRESS NOTES
Name: Supriya Mccray      : 1995      MRN: [id number]  Encounter Provider: VIRAJ Lam  Encounter Date: 2023   Encounter department: [address] [place] [address] PRIMARY CARE    Assessment & Plan     1. Gastroesophageal reflux disease, unspecified whether esophagitis present  Assessment & Plan:  - Stable on pantoprazole 40 mg daily. Continue same.   - Avoid triggering food and beverage.  - Will continue to monitor. 2. Mild intermittent asthma without complication  Assessment & Plan:  - Well controlled with use of albuterol as needed. Continue same.   - Will continue to monitor. 3. Dermatitis  Assessment & Plan:  - Prescription sent for kenalog cream.   - Consider referral to Dermatology. Orders:  -     triamcinolone (KENALOG) 0.5 % cream; Apply topically 3 (three) times a day    4. Seborrheic dermatitis of scalp  Assessment & Plan:  - Prescription sent for Nizoral shampoo. Orders:  -     ketoconazole (NIZORAL) 2 % shampoo; Apply 1 Application topically 2 (two) times a week    5. Screening examination for STD (sexually transmitted disease)  -     Hepatitis panel, acute; Future  -     Chlamydia/GC amplified DNA by PCR; Future  -     HIV 1/2 AG/AB w Reflex [facility] for 2 yr old and above; Future  -     Herpes I/II IgG Antibodies; Future  -     RPR-Syphilis Screening (Total Syphilis IGG/IGM); Future    6. Healthcare maintenance  Assessment & Plan:  - Reviewed immunizations and screenings. - UTD with dental, vision, and GYN exams.  - Routine labs ordered. Orders:  -     CBC and differential; Future  -     Comprehensive metabolic panel; Future  -     Lipid Panel with Direct LDL reflex; Future  -     TSH, 3rd generation with Free T4 reflex; Future        Depression Screening and Follow-up Plan: Patient was screened for depression during today's encounter. They screened negative with a PHQ-2 score of 1. Subjective     Patient presents to office today to establish care. Has PMH of GERD and asthma. She has complaints today of scalp pain that has been occurring for the past 2 weeks. States it is painful to wash her hair or put it in a ponytail. She denies any rashes of the scalp. She denies use of any new shampoos or conditioners. Also complains of skin rash that is red and itchy when she is outside. Usually happens when she is in the sun. She does not get the rash if she stays inside. Also reports vaginal itching and discharge. Was treated by her gynecologist for both yeast infection and BV and her symptoms are still not improved. Last sexual intercourse about 5 months ago. Does not have concern for STDs but is willing to get screening. She denies any other concerns or complaints today. Review of Systems   Constitutional: Negative for fatigue and fever. HENT: Negative for congestion, postnasal drip and trouble swallowing. Eyes: Negative for visual disturbance. Respiratory: Negative for cough and shortness of breath. Cardiovascular: Negative for chest pain and palpitations. Gastrointestinal: Negative for abdominal pain and blood in stool. Endocrine: Negative for cold intolerance and heat intolerance. Genitourinary: Positive for vaginal discharge. Negative for difficulty urinating and dysuria. Musculoskeletal: Negative for gait problem. Skin: Positive for rash. Neurological: Negative for dizziness, syncope and headaches. Hematological: Negative for adenopathy. Psychiatric/Behavioral: Negative for behavioral problems.        Past Medical History:   Diagnosis Date   • Asthma     in early childhood   • Chlamydia 2020   • Fatty liver    • H. pylori infection     2020, 2021   • Prediabetes      Past Surgical History:   Procedure Laterality Date   • APPENDECTOMY     • OVARIAN CYST REMOVAL Right 11/20/2019     Family History   Problem Relation Age of Onset   • No Known Problems Mother    • No Known Problems Father    • No Known Problems Sister    • No Known Problems Sister    • Diabetes Maternal Grandmother    • No Known Problems Maternal Grandfather    • No Known Problems Paternal Grandmother    • No Known Problems Paternal Grandfather    • Breast cancer Neg Hx    • Cancer Neg Hx    • Colon cancer Neg Hx    • Ovarian cancer Neg Hx      Social History     Socioeconomic History   • Marital status: Single     Spouse name: None   • Number of children: None   • Years of education: None   • Highest education level: None   Occupational History   • Occupation:  - [employer]    Tobacco Use   • Smoking status: Never   • Smokeless tobacco: Never   Vaping Use   • Vaping Use: Never used   Substance and Sexual Activity   • Alcohol use: Yes     Comment: couple times/year   • Drug use: Never   • Sexual activity: Not Currently     Partners: Male     Birth control/protection: Condom Male   Other Topics Concern   • None   Social History Narrative   • None     Social Determinants of Health     Financial Resource Strain: Low Risk  (11/9/2022)    Overall Financial Resource Strain (CARDIA)    • Difficulty of Paying Living Expenses: Not hard at all   Food Insecurity: No Food Insecurity (11/9/2022)    Hunger Vital Sign    • Worried About Running Out of Food in the Last Year: Never true    • Ran Out of Food in the Last Year: Never true   Transportation Needs: No Transportation Needs (11/9/2022)    PRAPARE - Transportation    • Lack of Transportation (Medical): No    • Lack of Transportation (Non-Medical):  No   Physical Activity: Not on file   Stress: Not on file   Social Connections: Not on file   Intimate Partner Violence: Not on file   Housing Stability: Not on file     Current Outpatient Medications on File Prior to Visit   Medication Sig   • albuterol (PROVENTIL HFA,VENTOLIN HFA) 90 mcg/act inhaler Inhale 2 puffs every 6 (six) hours as needed for wheezing or shortness of breath   • ondansetron (Zofran ODT) 4 mg disintegrating tablet Take 1 tablet (4 mg total) by mouth every 6 (six) hours as needed for nausea or vomiting   • pantoprazole (PROTONIX) 40 mg tablet Take 40 mg by mouth daily   • fluticasone (FLOVENT HFA) 110 MCG/ACT inhaler Inhale 2 puffs 2 (two) times a day Rinse mouth after use. (Patient not taking: Reported on 8/31/2023)   • sucralfate (CARAFATE) 1 g tablet Take 1 tablet (1 g total) by mouth 4 (four) times a day (Patient not taking: Reported on 8/31/2023)   • [DISCONTINUED] omeprazole (PriLOSEC) 40 MG capsule Take 1 capsule (40 mg total) by mouth daily     No Known Allergies  Immunization History   Administered Date(s) Administered   • COVID-19 PFIZER VACCINE 0.3 ML IM 05/13/2021, 06/03/2021   • DTaP 08/23/2012, 04/16/2013   • HPV9 08/11/2017, 10/11/2017, 01/14/2020   • Hep B, Adolescent or Pediatric 08/23/2012, 10/16/2012, 12/27/2012   • INFLUENZA 02/21/2019   • IPV 10/16/2012, 04/16/2013, 06/13/2013   • MMR 08/23/2012, 10/16/2012   • Meningococcal MCV4P 08/23/2012, 09/26/2014   • Td (adult), adsorbed 08/23/2012, 04/16/2013   • Tdap 10/16/2012, 09/26/2014, 02/21/2019   • Varicella 08/23/2012, 10/16/2012       Objective     /74 (BP Location: Left arm, Patient Position: Sitting, Cuff Size: Adult)   Pulse 84   Temp 97.6 °F (36.4 °C) (Tympanic)   Ht 5' 2" (1.575 m)   Wt 67 kg (147 lb 9.6 oz)   SpO2 98%   BMI 27.00 kg/m²     Physical Exam  Vitals and nursing note reviewed. Constitutional:       General: She is not in acute distress. Appearance: Normal appearance. She is not ill-appearing. HENT:      Head: Normocephalic and atraumatic. Right Ear: Tympanic membrane, ear canal and external ear normal.      Left Ear: Tympanic membrane, ear canal and external ear normal.      Nose: Nose normal.      Mouth/Throat:      Mouth: Mucous membranes are moist.   Eyes:      Conjunctiva/sclera: Conjunctivae normal.      Pupils: Pupils are equal, round, and reactive to light. Cardiovascular:      Rate and Rhythm: Normal rate and regular rhythm.       Heart sounds: Normal heart sounds. Pulmonary:      Effort: Pulmonary effort is normal.      Breath sounds: Normal breath sounds. Abdominal:      General: Bowel sounds are normal.      Palpations: Abdomen is soft. Musculoskeletal:         General: Normal range of motion. Cervical back: Normal range of motion. Lymphadenopathy:      Cervical: No cervical adenopathy. Skin:     General: Skin is warm and dry. Neurological:      Mental Status: She is alert and oriented to person, place, and time.    Psychiatric:         Mood and Affect: Mood normal.         Behavior: Behavior normal.       VIRAJ Helms

## 2023-08-31 NOTE — ASSESSMENT & PLAN NOTE
- Stable on pantoprazole 40 mg daily. Continue same.   - Avoid triggering food and beverage.  - Will continue to monitor.

## 2023-08-31 NOTE — ASSESSMENT & PLAN NOTE
- Reviewed immunizations and screenings. - UTD with dental, vision, and GYN exams.  - Routine labs ordered.

## 2023-09-05 ENCOUNTER — TELEPHONE (OUTPATIENT)
Dept: FAMILY MEDICINE CLINIC | Facility: CLINIC | Age: 28
End: 2023-09-05

## 2023-09-05 NOTE — TELEPHONE ENCOUNTER
Labs show positive HSV-1 (cold sores). Rest of STD screening was negative. All her other labs are stable.

## 2023-09-13 DIAGNOSIS — R11.2 NAUSEA AND VOMITING: ICD-10-CM

## 2023-09-13 RX ORDER — ONDANSETRON 4 MG/1
4 TABLET, ORALLY DISINTEGRATING ORAL EVERY 6 HOURS PRN
Qty: 20 TABLET | Refills: 0 | Status: SHIPPED | OUTPATIENT
Start: 2023-09-13

## 2023-10-02 DIAGNOSIS — L21.9 SEBORRHEIC DERMATITIS OF SCALP: ICD-10-CM

## 2023-10-03 RX ORDER — KETOCONAZOLE 20 MG/ML
1 SHAMPOO TOPICAL 2 TIMES WEEKLY
Qty: 120 ML | Refills: 0 | Status: SHIPPED | OUTPATIENT
Start: 2023-10-05

## 2023-10-24 ENCOUNTER — HOSPITAL ENCOUNTER (EMERGENCY)
Facility: HOSPITAL | Age: 28
Discharge: HOME | End: 2023-10-24
Attending: EMERGENCY MEDICINE | Admitting: EMERGENCY MEDICINE
Payer: COMMERCIAL

## 2023-10-24 ENCOUNTER — APPOINTMENT (EMERGENCY)
Dept: ULTRASOUND IMAGING | Facility: HOSPITAL | Age: 28
End: 2023-10-24
Payer: COMMERCIAL

## 2023-10-24 VITALS
HEART RATE: 94 BPM | RESPIRATION RATE: 18 BRPM | WEIGHT: 149.69 LBS | OXYGEN SATURATION: 98 % | DIASTOLIC BLOOD PRESSURE: 78 MMHG | BODY MASS INDEX: 27.38 KG/M2 | TEMPERATURE: 98.2 F | SYSTOLIC BLOOD PRESSURE: 119 MMHG

## 2023-10-24 DIAGNOSIS — R10.9 ABDOMINAL PAIN DURING PREGNANCY IN FIRST TRIMESTER: Primary | ICD-10-CM

## 2023-10-24 DIAGNOSIS — O26.891 ABDOMINAL PAIN DURING PREGNANCY IN FIRST TRIMESTER: Primary | ICD-10-CM

## 2023-10-24 LAB
ALBUMIN SERPL BCP-MCNC: 4.3 G/DL (ref 3.5–5)
ALP SERPL-CCNC: 31 U/L (ref 34–104)
ALT SERPL W P-5'-P-CCNC: 17 U/L (ref 7–52)
AMORPH URATE CRY URNS QL MICRO: ABNORMAL
ANION GAP SERPL CALCULATED.3IONS-SCNC: 8 MMOL/L
AST SERPL W P-5'-P-CCNC: 17 U/L (ref 13–39)
B-HCG SERPL-ACNC: ABNORMAL MIU/ML (ref 0–5)
BACTERIA UR QL AUTO: ABNORMAL /HPF
BASOPHILS # BLD AUTO: 0.02 THOUSANDS/ÂΜL (ref 0–0.1)
BASOPHILS NFR BLD AUTO: 0 % (ref 0–1)
BILIRUB SERPL-MCNC: 0.34 MG/DL (ref 0.2–1)
BILIRUB UR QL STRIP: NEGATIVE
BUN SERPL-MCNC: 9 MG/DL (ref 5–25)
CALCIUM SERPL-MCNC: 9.2 MG/DL (ref 8.4–10.2)
CHLORIDE SERPL-SCNC: 107 MMOL/L (ref 96–108)
CLARITY UR: ABNORMAL
CO2 SERPL-SCNC: 22 MMOL/L (ref 21–32)
COLOR UR: YELLOW
CREAT SERPL-MCNC: 0.61 MG/DL (ref 0.6–1.3)
EOSINOPHIL # BLD AUTO: 0.04 THOUSAND/ÂΜL (ref 0–0.61)
EOSINOPHIL NFR BLD AUTO: 1 % (ref 0–6)
ERYTHROCYTE [DISTWIDTH] IN BLOOD BY AUTOMATED COUNT: 12.4 % (ref 11.6–15.1)
EXT PREGNANCY TEST URINE: POSITIVE
EXT. CONTROL: ABNORMAL
GFR SERPL CREATININE-BSD FRML MDRD: 125 ML/MIN/1.73SQ M
GLUCOSE SERPL-MCNC: 94 MG/DL (ref 65–140)
GLUCOSE UR STRIP-MCNC: NEGATIVE MG/DL
HCT VFR BLD AUTO: 36.9 % (ref 34.8–46.1)
HGB BLD-MCNC: 12 G/DL (ref 11.5–15.4)
HGB UR QL STRIP.AUTO: ABNORMAL
IMM GRANULOCYTES # BLD AUTO: 0.04 THOUSAND/UL (ref 0–0.2)
IMM GRANULOCYTES NFR BLD AUTO: 1 % (ref 0–2)
KETONES UR STRIP-MCNC: NEGATIVE MG/DL
LEUKOCYTE ESTERASE UR QL STRIP: ABNORMAL
LYMPHOCYTES # BLD AUTO: 2.33 THOUSANDS/ÂΜL (ref 0.6–4.47)
LYMPHOCYTES NFR BLD AUTO: 27 % (ref 14–44)
MCH RBC QN AUTO: 26.5 PG (ref 26.8–34.3)
MCHC RBC AUTO-ENTMCNC: 32.5 G/DL (ref 31.4–37.4)
MCV RBC AUTO: 82 FL (ref 82–98)
MONOCYTES # BLD AUTO: 0.39 THOUSAND/ÂΜL (ref 0.17–1.22)
MONOCYTES NFR BLD AUTO: 5 % (ref 4–12)
MUCOUS THREADS UR QL AUTO: ABNORMAL
NEUTROPHILS # BLD AUTO: 5.84 THOUSANDS/ÂΜL (ref 1.85–7.62)
NEUTS SEG NFR BLD AUTO: 66 % (ref 43–75)
NITRITE UR QL STRIP: NEGATIVE
NON-SQ EPI CELLS URNS QL MICRO: ABNORMAL /HPF
NRBC BLD AUTO-RTO: 0 /100 WBCS
PH UR STRIP.AUTO: 5.5 [PH] (ref 4.5–8)
PLATELET # BLD AUTO: 287 THOUSANDS/UL (ref 149–390)
PMV BLD AUTO: 9.9 FL (ref 8.9–12.7)
POTASSIUM SERPL-SCNC: 3.6 MMOL/L (ref 3.5–5.3)
PROT SERPL-MCNC: 7 G/DL (ref 6.4–8.4)
PROT UR STRIP-MCNC: ABNORMAL MG/DL
RBC # BLD AUTO: 4.52 MILLION/UL (ref 3.81–5.12)
RBC #/AREA URNS AUTO: ABNORMAL /HPF
SODIUM SERPL-SCNC: 137 MMOL/L (ref 135–147)
SP GR UR STRIP.AUTO: 1.02 (ref 1–1.03)
UROBILINOGEN UR QL STRIP.AUTO: 0.2 E.U./DL
WBC # BLD AUTO: 8.66 THOUSAND/UL (ref 4.31–10.16)
WBC #/AREA URNS AUTO: ABNORMAL /HPF

## 2023-10-24 PROCEDURE — 80053 COMPREHEN METABOLIC PANEL: CPT

## 2023-10-24 PROCEDURE — 87147 CULTURE TYPE IMMUNOLOGIC: CPT

## 2023-10-24 PROCEDURE — 81001 URINALYSIS AUTO W/SCOPE: CPT

## 2023-10-24 PROCEDURE — 99285 EMERGENCY DEPT VISIT HI MDM: CPT

## 2023-10-24 PROCEDURE — 81025 URINE PREGNANCY TEST: CPT

## 2023-10-24 PROCEDURE — 84702 CHORIONIC GONADOTROPIN TEST: CPT

## 2023-10-24 PROCEDURE — 85025 COMPLETE CBC W/AUTO DIFF WBC: CPT

## 2023-10-24 PROCEDURE — 99284 EMERGENCY DEPT VISIT MOD MDM: CPT

## 2023-10-24 PROCEDURE — 87086 URINE CULTURE/COLONY COUNT: CPT

## 2023-10-24 PROCEDURE — 36415 COLL VENOUS BLD VENIPUNCTURE: CPT

## 2023-10-24 PROCEDURE — 76801 OB US < 14 WKS SINGLE FETUS: CPT

## 2023-10-24 RX ORDER — ACETAMINOPHEN 325 MG/1
975 TABLET ORAL ONCE
Status: COMPLETED | OUTPATIENT
Start: 2023-10-24 | End: 2023-10-24

## 2023-10-24 RX ADMIN — ACETAMINOPHEN 325MG 975 MG: 325 TABLET ORAL at 12:18

## 2023-10-24 NOTE — Clinical Note
Rexie Cheadle was seen and treated in our emergency department on 10/24/2023. Diagnosis:     Yafreisi  . She may return on this date: If you have any questions or concerns, please don't hesitate to call.       Toro Solitario, DO    ______________________________           _______________          _______________  Hospital Representative                              Date                                Time

## 2023-10-24 NOTE — DISCHARGE INSTRUCTIONS
You are about 6 weeks pregnant. Your approximate due date is 6/17/24. Please schedule an appointment with OB/GYN for continued follow up throughout pregnancy. Start taking prenatal vitamins. Use tylenol as needed for pain, do not use ibuprofen.

## 2023-10-24 NOTE — Clinical Note
Jordana Cade was seen and treated in our emergency department on 10/24/2023. Other - See Comments        Diagnosis:     Sheldon Ramos  may return to work on return date. She may return on this date: 10/25/2023    Patient is pregnant. Please be advised. Patient will provide follow-up guidelines from [facility] provider. If you have any questions or concerns, please don't hesitate to call.       Donavan Hurt RN    ______________________________           _______________          _______________  Hospital Representative                              Date                                Time

## 2023-10-24 NOTE — ED PROVIDER NOTES
History  Chief Complaint   Patient presents with    Abdominal Pain     Pt c/o left sided abd pain near her ovaries for the past x 5 days. Pt states she took a home pregnancy test which was positive. Pt c/o nausea but denies v/d or fevers     Patient is a 53-year-old female with a history of asthma, appendectomy, ovarian cyst removal presenting for evaluation pain for 5 days. Reports bilateral lower abdominal pain more significant on the left. Pain is constant and describes it as a "punching" pain. She had 4 positive pregnancy tests at home a few days ago. Otherwise denies fevers, nausea, vomiting, diarrhea, constipation, dysuria, hematuria, abnormal vaginal discharge or bleeding. This is her first pregnancy. Denies concern for STIs. No medications prior to arrival.          Prior to Admission Medications   Prescriptions Last Dose Informant Patient Reported? Taking? albuterol (PROVENTIL HFA,VENTOLIN HFA) 90 mcg/act inhaler  Self No No   Sig: Inhale 2 puffs every 6 (six) hours as needed for wheezing or shortness of breath   fluticasone (FLOVENT HFA) 110 MCG/ACT inhaler More than a month Self No No   Sig: Inhale 2 puffs 2 (two) times a day Rinse mouth after use.    Patient not taking: Reported on 8/31/2023   ketoconazole (NIZORAL) 2 % shampoo   No No   Sig: APPLY 1 APPLICATION TOPICALLY 2 (TWO) TIMES A WEEK   ondansetron (Zofran ODT) 4 mg disintegrating tablet   No No   Sig: Take 1 tablet (4 mg total) by mouth every 6 (six) hours as needed for nausea or vomiting   pantoprazole (PROTONIX) 40 mg tablet Not Taking  Yes No   Sig: Take 40 mg by mouth daily   Patient not taking: Reported on 10/24/2023   sucralfate (CARAFATE) 1 g tablet More than a month  No No   Sig: Take 1 tablet (1 g total) by mouth 4 (four) times a day   Patient not taking: Reported on 8/31/2023   triamcinolone (KENALOG) 0.5 % cream Not Taking  No No   Sig: Apply topically 3 (three) times a day   Patient not taking: Reported on 10/24/2023 Facility-Administered Medications: None       Past Medical History:   Diagnosis Date    Asthma     in early childhood    Chlamydia 2020    Fatty liver     H. pylori infection     2020, 2021    Prediabetes        Past Surgical History:   Procedure Laterality Date    APPENDECTOMY      OVARIAN CYST REMOVAL Right 11/20/2019       Family History   Problem Relation Age of Onset    No Known Problems Mother     No Known Problems Father     No Known Problems Sister     No Known Problems Sister     Diabetes Maternal Grandmother     No Known Problems Maternal Grandfather     No Known Problems Paternal Grandmother     No Known Problems Paternal Grandfather     Breast cancer Neg Hx     Cancer Neg Hx     Colon cancer Neg Hx     Ovarian cancer Neg Hx      I have reviewed and agree with the history as documented. E-Cigarette/Vaping    E-Cigarette Use Never User      E-Cigarette/Vaping Substances     Social History     Tobacco Use    Smoking status: Never    Smokeless tobacco: Never   Vaping Use    Vaping Use: Never used   Substance Use Topics    Alcohol use: Yes     Comment: couple times/year    Drug use: Never       Review of Systems   Constitutional:  Negative for chills and fever. HENT:  Negative for congestion, ear pain and sore throat. Eyes:  Negative for pain and visual disturbance. Respiratory:  Negative for cough and shortness of breath. Cardiovascular:  Negative for chest pain and palpitations. Gastrointestinal:  Positive for abdominal pain. Negative for constipation, diarrhea, nausea and vomiting. Genitourinary:  Negative for dysuria, flank pain, frequency, hematuria, vaginal bleeding and vaginal discharge. Musculoskeletal:  Negative for arthralgias and back pain. Skin:  Negative for color change and rash. Neurological:  Negative for dizziness, seizures, syncope and headaches. All other systems reviewed and are negative. Physical Exam  Physical Exam  Vitals and nursing note reviewed. Constitutional:       General: She is not in acute distress. Appearance: Normal appearance. She is not ill-appearing. HENT:      Head: Normocephalic and atraumatic. Right Ear: External ear normal.      Left Ear: External ear normal.      Nose: Nose normal.      Mouth/Throat:      Mouth: Mucous membranes are moist.   Eyes:      General: No scleral icterus. Right eye: No discharge. Left eye: No discharge. Extraocular Movements: Extraocular movements intact. Cardiovascular:      Rate and Rhythm: Normal rate and regular rhythm. Pulses: Normal pulses. Heart sounds: Normal heart sounds. Pulmonary:      Effort: Pulmonary effort is normal. No respiratory distress. Breath sounds: Normal breath sounds. Abdominal:      General: Abdomen is flat. Palpations: Abdomen is soft. Tenderness: There is abdominal tenderness in the right lower quadrant, suprapubic area and left lower quadrant. There is no right CVA tenderness, left CVA tenderness, guarding or rebound. Musculoskeletal:         General: No tenderness, deformity or signs of injury. Cervical back: Normal range of motion and neck supple. Skin:     General: Skin is dry. Coloration: Skin is not jaundiced. Findings: No erythema or rash. Neurological:      General: No focal deficit present. Mental Status: She is alert and oriented to person, place, and time. Mental status is at baseline. Motor: No weakness. Gait: Gait normal.   Psychiatric:         Mood and Affect: Mood normal.         Behavior: Behavior normal.         Thought Content:  Thought content normal.         Vital Signs  ED Triage Vitals [10/24/23 1040]   Temperature Pulse Respirations Blood Pressure SpO2   98.2 °F (36.8 °C) 86 18 133/72 100 %      Temp Source Heart Rate Source Patient Position - Orthostatic VS BP Location FiO2 (%)   Oral Monitor Sitting Right arm --      Pain Score       8           Vitals:    10/24/23 1040 10/24/23 1408   BP: 133/72 119/78   Pulse: 86 94   Patient Position - Orthostatic VS: Sitting Lying         Visual Acuity      ED Medications  Medications   acetaminophen (TYLENOL) tablet 975 mg (975 mg Oral Given 10/24/23 1218)       Diagnostic Studies  Results Reviewed       Procedure Component Value Units Date/Time    Quantitative hCG [[id number]]  (Abnormal) Collected: 10/24/23 1217    Lab Status: Final result Specimen: Blood from Arm, Right Updated: 10/24/23 1309     HCG, Quant 17,353 mIU/mL     Narrative:       Expected Ranges:    HCG results between 5 and 25 mIU/mL may be indicative of early pregnancy but should be interpreted in light of the total clinical presentation. HCG can rise to detectable levels in sofia and post menopausal women (0-11.6 mIU/mL).      Approximate               Approximate HCG  Gestation age          Concentration ( mIU/mL)  _____________          ______________________   Onita Butcher                      HCG values  0.2-1                       5-50  1-2                           2-3                         100-5000  3-4                         500-03851  4-5                         1000-27594  5-6                         [id number]  6-8                         [id number]  8-12                        [id number]      Comprehensive metabolic panel [[id number]]  (Abnormal) Collected: 10/24/23 1217    Lab Status: Final result Specimen: Blood from Arm, Right Updated: 10/24/23 1240     Sodium 137 mmol/L      Potassium 3.6 mmol/L      Chloride 107 mmol/L      CO2 22 mmol/L      ANION GAP 8 mmol/L      BUN 9 mg/dL      Creatinine 0.61 mg/dL      Glucose 94 mg/dL      Calcium 9.2 mg/dL      AST 17 U/L      ALT 17 U/L      Alkaline Phosphatase 31 U/L      Total Protein 7.0 g/dL      Albumin 4.3 g/dL      Total Bilirubin 0.34 mg/dL      eGFR 125 ml/min/1.73sq m     Narrative:      [place] guidelines for Chronic Kidney Disease (CKD):     Stage 1 with normal or high GFR (GFR > 90 mL/min/1.73 square meters)    Stage 2 Mild CKD (GFR = 60-89 mL/min/1.73 square meters)    Stage 3A Moderate CKD (GFR = 45-59 mL/min/1.73 square meters)    Stage 3B Moderate CKD (GFR = 30-44 mL/min/1.73 square meters)    Stage 4 Severe CKD (GFR = 15-29 mL/min/1.73 square meters)    Stage 5 End Stage CKD (GFR <15 mL/min/1.73 square meters)  Note: GFR calculation is accurate only with a steady state creatinine    Urine Microscopic [[id number]]  (Abnormal) Collected: 10/24/23 1152    Lab Status: Final result Specimen: Urine, Clean Catch Updated: 10/24/23 1227     RBC, UA 4-10 /hpf      WBC, UA 30-50 /hpf      Epithelial Cells Moderate /hpf      Bacteria, UA Moderate /hpf      MUCUS THREADS Moderate     Amorphous Crystals, UA Occasional    CBC and differential [[id number]]  (Abnormal) Collected: 10/24/23 1217    Lab Status: Final result Specimen: Blood from Arm, Right Updated: 10/24/23 1224     WBC 8.66 Thousand/uL      RBC 4.52 Million/uL      Hemoglobin 12.0 g/dL      Hematocrit 36.9 %      MCV 82 fL      MCH 26.5 pg      MCHC 32.5 g/dL      RDW 12.4 %      MPV 9.9 fL      Platelets 937 Thousands/uL      nRBC 0 /100 WBCs      Neutrophils Relative 66 %      Immat GRANS % 1 %      Lymphocytes Relative 27 %      Monocytes Relative 5 %      Eosinophils Relative 1 %      Basophils Relative 0 %      Neutrophils Absolute 5.84 Thousands/µL      Immature Grans Absolute 0.04 Thousand/uL      Lymphocytes Absolute 2.33 Thousands/µL      Monocytes Absolute 0.39 Thousand/µL      Eosinophils Absolute 0.04 Thousand/µL      Basophils Absolute 0.02 Thousands/µL     Urine culture [[id number]] Collected: 10/24/23 1152    Lab Status:  In process Specimen: Urine, Clean Catch Updated: 10/24/23 1200    POCT pregnancy, urine [[id number]]  (Abnormal) Resulted: 10/24/23 1154    Lab Status: Final result Updated: 10/24/23 1155     EXT Preg Test, Ur Positive     Control Valid    Urine Macroscopic, POC [[id number]]  (Abnormal) Collected: 10/24/23 1152    Lab Status: Final result Specimen: Urine Updated: 10/24/23 1154     Color, UA Yellow     Clarity, UA Cloudy     pH, UA 5.5     Leukocytes, UA Small     Nitrite, UA Negative     Protein, UA Trace mg/dl      Glucose, UA Negative mg/dl      Ketones, UA Negative mg/dl      Urobilinogen, UA 0.2 E.U./dl      Bilirubin, UA Negative     Occult Blood, UA Trace     Specific Gravity, UA 1.025    Narrative:      CLINITEK RESULT                   US OB < 14 weeks with transvaginal   Final Result by Rod Simms MD (10/24 1415)      Single live intrauterine gestation at 6 weeks 1 days (range +/- 3). GABRIELLE of 06/17/2024. 8.4 x 7.2 x 8.8 cm right ovarian simple cyst.      Workstation performed: [id number]                    Procedures  Procedures         ED Course  ED Course as of 10/24/23 1432   Tue Oct 24, 2023   1108 LMP 9/12/23. SBIRT 22yo+      Flowsheet Row Most Recent Value   Initial Alcohol Screen: US AUDIT-C     1. How often do you have a drink containing alcohol? 0 Filed at: 10/24/2023 1044   2. How many drinks containing alcohol do you have on a typical day you are drinking? 0 Filed at: 10/24/2023 1044   3a. Male UNDER 65: How often do you have five or more drinks on one occasion? 0 Filed at: 10/24/2023 1044   3b. FEMALE Any Age, or MALE 65+: How often do you have 4 or more drinks on one occassion? 0 Filed at: 10/24/2023 1044   Audit-C Score 0 Filed at: 10/24/2023 1044   NINO: How many times in the past year have you. .. Used an illegal drug or used a prescription medication for non-medical reasons? Never Filed at: 10/24/2023 1044                      Medical Decision Making  Patient is a 81-year-old female presenting for evaluation of lower abdominal pain for 5 days. Positive pregnancy test at home. No other associated symptoms. All vitals are within normal limits on arrival to the ED.   Physical exam is remarkable for bilateral lower abdominal tenderness without rebound or guarding, more significant on the left. DDx including but not limited to: Ectopic pregnancy, ovarian cyst, ovarian torsion, cystitis, STI/PID. Urine pregnancy positive in the ED. Plan: CBC, CMP, quantitative hCG, pelvic ultrasound, UA. Will treat symptomatically with Tylenol. Labs negative for leukocytosis, anemia, lecture abnormalities, KRISTIN. hCG quantitative is 17,000. UA has small leukocytes and trace blood, no nitrates. In absence of urinary symptoms, will not treat and send for urine culture. Ultrasound shows single live intrauterine gestation at 6 weeks 1 day. Also has a simple right ovarian cyst.  Discussed findings with patient. Placed ambulatory referral and provided contact information for OB to establish care. Instructed to start prenatals and discontinue ibuprofen use. I have discussed findings and plan for discharge with the patient/caregiver. Follow up with the appropriate providers including primary care physician was discussed. Return precautions discussed with patient/caregiver as outlined in AVS. Patient/caregiver verbally expressed understanding. Patient stable at time of discharge and ambulated out of the emergency department. Amount and/or Complexity of Data Reviewed  Labs: ordered. Radiology: ordered. Risk  OTC drugs. Disposition  Final diagnoses:   Abdominal pain during pregnancy in first trimester     Time reflects when diagnosis was documented in both MDM as applicable and the Disposition within this note       Time User Action Codes Description Comment    10/24/2023  2:20 PM Lindsay Farrell Add [O26.891,  R10.9] Abdominal pain during pregnancy in first trimester           ED Disposition       ED Disposition   Discharge    Condition   Stable    Date/Time   Tue Oct 24, 2023 [address] discharge to home/self care.                    Follow-up Information       Follow up With Specialties Details Why [facility] Schedule an appointment as soon as possible for a visit   [address] [facility] [address] [facility] [address] [address] [facility], [address] [facility], [address] [place] [address], [id number] [phone]            Patient's Medications   Discharge Prescriptions    No medications on file           PDMP Review       None            ED Provider  Electronically Signed by             Ada Lorenzo PA-C  10/24/23 2554

## 2023-10-24 NOTE — Clinical Note
Socorro Kraft was seen and treated in our emergency department on 10/24/2023. Diagnosis:     Kanchan Sandra  may return to work on return date. She may return on this date: 10/25/2023         If you have any questions or concerns, please don't hesitate to call.       Kerrie Cruz PA-C    ______________________________           _______________          _______________  Hospital Representative                              Date                                Time

## 2023-10-26 LAB
BACTERIA UR CULT: ABNORMAL
BACTERIA UR CULT: ABNORMAL

## 2023-10-30 PROBLEM — Z00.00 HEALTHCARE MAINTENANCE: Status: RESOLVED | Noted: 2023-08-31 | Resolved: 2023-10-30

## 2023-10-30 PROBLEM — Z11.3 SCREENING EXAMINATION FOR STD (SEXUALLY TRANSMITTED DISEASE): Status: RESOLVED | Noted: 2023-08-31 | Resolved: 2023-10-30

## 2023-11-01 ENCOUNTER — ULTRASOUND (OUTPATIENT)
Dept: OBGYN CLINIC | Facility: CLINIC | Age: 28
End: 2023-11-01

## 2023-11-01 VITALS
HEIGHT: 62 IN | BODY MASS INDEX: 27.31 KG/M2 | SYSTOLIC BLOOD PRESSURE: 135 MMHG | DIASTOLIC BLOOD PRESSURE: 69 MMHG | WEIGHT: 148.4 LBS | HEART RATE: 97 BPM

## 2023-11-01 DIAGNOSIS — O36.80X0 PREGNANCY WITH UNCERTAIN FETAL VIABILITY, SINGLE OR UNSPECIFIED FETUS: Primary | ICD-10-CM

## 2023-11-01 NOTE — PATIENT INSTRUCTIONS
SENALES BELLE EL EMBARAZO  Llame a nuestra oficina al [phone] para cualquiera de los siguientes:    1. sangrado vaginal  2. Dolor abdominal ron que no desaparece. 3. Fiebre (más de 100.4 y no se fabien con Tylenol)  4. Vómitos persistentes que lora más de 24 horas. 5. dolor de pecho  6. Dolor o ardor al orinar. 7. Dolor de mina severo que no se resuelve con Tylenol  8. Visión borrosa o carol puntos en silva visión. 9. Hinchazón repentina de silva sonal o charles. 10. Enrojecimiento, hinchazón o dolor en natty pierna. 11. Un aumento de peso repentino en pocos días. 12. Contar los movimientos fetales del bebé. (después de 28 semanas o el sexto mes de embarazo)  15. Natty pérdida de líquido acuoso de la vagina: puede ser un chorro, un goteo o natty humedad continua  14.  Después de 20 semanas de embarazo, calambres rítmicos (más de 4 por hora) o menstruales hawa dolor Brianna Navdeep / Shani Leyland

## 2023-11-01 NOTE — PROGRESS NOTES
VIABILITY SCAN    Vinnie Gardiner is a 34 y.o. female who presents today for viability. Patient reports it as follows:    Past Medical History:   Diagnosis Date    Asthma     in early childhood    Chlamydia     Fatty liver     H. pylori infection     2021    Prediabetes      Past Surgical History:   Procedure Laterality Date    APPENDECTOMY      OVARIAN CYST REMOVAL Right 2019     OB History          1    Para   0    Term   0       0    AB   0    Living   0         SAB   0    IAB   0    Ectopic   0    Multiple   0    Live Births   0               Social History     Tobacco Use    Smoking status: Never    Smokeless tobacco: Never   Vaping Use    Vaping Use: Never used   Substance Use Topics    Alcohol use: Not Currently     Comment: couple times/year    Drug use: Never     Social History     Substance and Sexual Activity   Sexual Activity Not Currently    Partners: Male    Birth control/protection: Condom Male       Current Outpatient Medications   Medication Instructions    albuterol (PROVENTIL HFA,VENTOLIN HFA) 90 mcg/act inhaler 2 puffs, Inhalation, Every 6 hours PRN    fluticasone (FLOVENT HFA) 110 MCG/ACT inhaler 2 puffs, Inhalation, 2 times daily, Rinse mouth after use.    ketoconazole (NIZORAL) 2 % shampoo 1 Application, Topical, 2 times weekly    ondansetron (ZOFRAN ODT) 4 mg, Oral, Every 6 hours PRN    pantoprazole (PROTONIX) 40 mg, Daily    sucralfate (CARAFATE) 1 g, Oral, 4 times daily    triamcinolone (KENALOG) 0.5 % cream Topical, 3 times daily       History of Present Illness:   Patient presents for viability with c/o off and on cramping. Review of Systems:  Review of Systems   All other systems reviewed and are negative. Physical Exam:  /69   Pulse 97   Ht 5' 2" (1.575 m)   Wt 67.3 kg (148 lb 6.4 oz)   LMP 2023 (Exact Date)   BMI 27.14 kg/m²   Physical Exam  Constitutional:       Appearance: Normal appearance.    Genitourinary:      Vulva normal. Neurological:      Mental Status: She is alert. Vitals reviewed. Bedside sonogram:  FHT 153bpm  CRL 1.04cm  EGA 7w1d  EDC 6/17/24 by 1st trimester sonogram    Assessment:   1. SLIUP at [address]:   2. Return to office 2-3wks for ob intake. Reviewed with patient that test results are available in MyChart immediately, but that they will not necessarily be reviewed by me immediately. Explained that I will review results at my earliest opportunity and contact patient appropriately.

## 2023-11-15 ENCOUNTER — TELEPHONE (OUTPATIENT)
Dept: OBGYN CLINIC | Facility: CLINIC | Age: 28
End: 2023-11-15

## 2023-12-29 ENCOUNTER — HOSPITAL ENCOUNTER (EMERGENCY)
Facility: HOSPITAL | Age: 28
Discharge: HOME | End: 2023-12-29
Attending: EMERGENCY MEDICINE
Payer: COMMERCIAL

## 2023-12-29 VITALS
DIASTOLIC BLOOD PRESSURE: 72 MMHG | TEMPERATURE: 98.4 F | RESPIRATION RATE: 18 BRPM | SYSTOLIC BLOOD PRESSURE: 111 MMHG | OXYGEN SATURATION: 100 % | HEART RATE: 93 BPM

## 2023-12-29 DIAGNOSIS — Z34.90 PREGNANCY: Primary | ICD-10-CM

## 2023-12-29 DIAGNOSIS — R55 SYNCOPE: ICD-10-CM

## 2023-12-29 DIAGNOSIS — R10.9 ABDOMINAL PAIN: ICD-10-CM

## 2023-12-29 LAB
ABO GROUP BLD: NORMAL
ALBUMIN SERPL BCP-MCNC: 4 G/DL (ref 3.5–5)
ALP SERPL-CCNC: 29 U/L (ref 34–104)
ALT SERPL W P-5'-P-CCNC: 10 U/L (ref 7–52)
ANION GAP SERPL CALCULATED.3IONS-SCNC: 8 MMOL/L
AST SERPL W P-5'-P-CCNC: 17 U/L (ref 13–39)
BASOPHILS # BLD AUTO: 0.02 THOUSANDS/ÂΜL (ref 0–0.1)
BASOPHILS NFR BLD AUTO: 0 % (ref 0–1)
BILIRUB SERPL-MCNC: 0.29 MG/DL (ref 0.2–1)
BILIRUB UR QL STRIP: NEGATIVE
BLD GP AB SCN SERPL QL: NEGATIVE
BUN SERPL-MCNC: 6 MG/DL (ref 5–25)
CALCIUM SERPL-MCNC: 9.3 MG/DL (ref 8.4–10.2)
CHLORIDE SERPL-SCNC: 104 MMOL/L (ref 96–108)
CLARITY UR: CLEAR
CO2 SERPL-SCNC: 22 MMOL/L (ref 21–32)
COLOR UR: ABNORMAL
CREAT SERPL-MCNC: 0.45 MG/DL (ref 0.6–1.3)
EOSINOPHIL # BLD AUTO: 0.04 THOUSAND/ÂΜL (ref 0–0.61)
EOSINOPHIL NFR BLD AUTO: 0 % (ref 0–6)
ERYTHROCYTE [DISTWIDTH] IN BLOOD BY AUTOMATED COUNT: 12.5 % (ref 11.6–15.1)
GFR SERPL CREATININE-BSD FRML MDRD: 138 ML/MIN/1.73SQ M
GLUCOSE SERPL-MCNC: 75 MG/DL (ref 65–140)
GLUCOSE UR STRIP-MCNC: NEGATIVE MG/DL
HCT VFR BLD AUTO: 32.9 % (ref 34.8–46.1)
HGB BLD-MCNC: 11.2 G/DL (ref 11.5–15.4)
HGB UR QL STRIP.AUTO: NEGATIVE
IMM GRANULOCYTES # BLD AUTO: 0.06 THOUSAND/UL (ref 0–0.2)
IMM GRANULOCYTES NFR BLD AUTO: 1 % (ref 0–2)
KETONES UR STRIP-MCNC: ABNORMAL MG/DL
LEUKOCYTE ESTERASE UR QL STRIP: NEGATIVE
LIPASE SERPL-CCNC: 66 U/L (ref 11–82)
LYMPHOCYTES # BLD AUTO: 2.14 THOUSANDS/ÂΜL (ref 0.6–4.47)
LYMPHOCYTES NFR BLD AUTO: 22 % (ref 14–44)
MCH RBC QN AUTO: 27.7 PG (ref 26.8–34.3)
MCHC RBC AUTO-ENTMCNC: 34 G/DL (ref 31.4–37.4)
MCV RBC AUTO: 81 FL (ref 82–98)
MONOCYTES # BLD AUTO: 0.51 THOUSAND/ÂΜL (ref 0.17–1.22)
MONOCYTES NFR BLD AUTO: 5 % (ref 4–12)
NEUTROPHILS # BLD AUTO: 7.09 THOUSANDS/ÂΜL (ref 1.85–7.62)
NEUTS SEG NFR BLD AUTO: 72 % (ref 43–75)
NITRITE UR QL STRIP: NEGATIVE
NRBC BLD AUTO-RTO: 0 /100 WBCS
PH UR STRIP.AUTO: 6 [PH]
PLATELET # BLD AUTO: 255 THOUSANDS/UL (ref 149–390)
PMV BLD AUTO: 10 FL (ref 8.9–12.7)
POTASSIUM SERPL-SCNC: 3.4 MMOL/L (ref 3.5–5.3)
PROT SERPL-MCNC: 6.7 G/DL (ref 6.4–8.4)
PROT UR STRIP-MCNC: NEGATIVE MG/DL
RBC # BLD AUTO: 4.04 MILLION/UL (ref 3.81–5.12)
RH BLD: NEGATIVE
SODIUM SERPL-SCNC: 134 MMOL/L (ref 135–147)
SP GR UR STRIP.AUTO: 1.02 (ref 1–1.03)
UROBILINOGEN UR STRIP-ACNC: <2 MG/DL
WBC # BLD AUTO: 9.86 THOUSAND/UL (ref 4.31–10.16)

## 2023-12-29 PROCEDURE — 96374 THER/PROPH/DIAG INJ IV PUSH: CPT

## 2023-12-29 PROCEDURE — 86900 BLOOD TYPING SEROLOGIC ABO: CPT | Performed by: EMERGENCY MEDICINE

## 2023-12-29 PROCEDURE — 80053 COMPREHEN METABOLIC PANEL: CPT

## 2023-12-29 PROCEDURE — 86901 BLOOD TYPING SEROLOGIC RH(D): CPT | Performed by: EMERGENCY MEDICINE

## 2023-12-29 PROCEDURE — 93005 ELECTROCARDIOGRAM TRACING: CPT

## 2023-12-29 PROCEDURE — 83690 ASSAY OF LIPASE: CPT

## 2023-12-29 PROCEDURE — 87086 URINE CULTURE/COLONY COUNT: CPT

## 2023-12-29 PROCEDURE — 87147 CULTURE TYPE IMMUNOLOGIC: CPT

## 2023-12-29 PROCEDURE — 99284 EMERGENCY DEPT VISIT MOD MDM: CPT | Performed by: EMERGENCY MEDICINE

## 2023-12-29 PROCEDURE — 85025 COMPLETE CBC W/AUTO DIFF WBC: CPT

## 2023-12-29 PROCEDURE — 99284 EMERGENCY DEPT VISIT MOD MDM: CPT

## 2023-12-29 PROCEDURE — 76815 OB US LIMITED FETUS(S): CPT | Performed by: EMERGENCY MEDICINE

## 2023-12-29 PROCEDURE — 81003 URINALYSIS AUTO W/O SCOPE: CPT

## 2023-12-29 PROCEDURE — 96361 HYDRATE IV INFUSION ADD-ON: CPT

## 2023-12-29 PROCEDURE — 86850 RBC ANTIBODY SCREEN: CPT | Performed by: EMERGENCY MEDICINE

## 2023-12-29 PROCEDURE — 36415 COLL VENOUS BLD VENIPUNCTURE: CPT

## 2023-12-29 RX ORDER — ACETAMINOPHEN 325 MG/1
975 TABLET ORAL ONCE
Status: COMPLETED | OUTPATIENT
Start: 2023-12-29 | End: 2023-12-29

## 2023-12-29 RX ORDER — ONDANSETRON 2 MG/ML
4 INJECTION INTRAMUSCULAR; INTRAVENOUS ONCE
Status: COMPLETED | OUTPATIENT
Start: 2023-12-29 | End: 2023-12-29

## 2023-12-29 RX ADMIN — ACETAMINOPHEN 325MG 975 MG: 325 TABLET ORAL at 17:09

## 2023-12-29 RX ADMIN — SODIUM CHLORIDE 1000 ML: 0.9 INJECTION, SOLUTION INTRAVENOUS at 17:10

## 2023-12-29 RX ADMIN — ONDANSETRON 4 MG: 2 INJECTION INTRAMUSCULAR; INTRAVENOUS at 17:09

## 2023-12-29 NOTE — Clinical Note
Pascale Chang was seen and treated in our emergency department on 12/29/2023.                Diagnosis:     Pascale  may return to work on return date.    She may return on this date: 12/30/2023         If you have any questions or concerns, please don't hesitate to call.      Kaiden Sheridan MD    ______________________________           _______________          _______________  Hospital Representative                              Date                                Time

## 2023-12-29 NOTE — ED ATTENDING ATTESTATION
2023  I, Michell Mcguire DO, saw and evaluated the patient. I have discussed the patient with the resident/non-physician practitioner and agree with the resident's/non-physician practitioner's findings, Plan of Care, and MDM as documented in the resident's/non-physician practitioner's note, except where noted. All available labs and Radiology studies were reviewed.  I was present for key portions of any procedure(s) performed by the resident/non-physician practitioner and I was immediately available to provide assistance.       At this point I agree with the current assessment done in the Emergency Department.  I have conducted an independent evaluation of this patient a history and physical is as follows:    29 yo F  15w4d pregnant presenting with N/V/D and fall.  Pt reports 5 days of loose stools, 2x/day and N/V. Having abdominal cramping.   States that today she vomited and passed out and fell forward onto her abdomen. She reports having some abdominal discomfort.   States that 2 days ago had small amount vaginal bleeding described as brown  No vaginal bleeding/loss of fluid today    Denies fevers, chills, CP, SOB  23 LV[facility] blood type: B-    Plan:   N/V/D- sx management/IVF, CBC to assess for leukocytosis/anemia, CMP to assess for elevated LFTs/KRISTIN/electrolyte abn, lipase to r/o pancreatitis   - Pregnancy/abdominal trauma- Bedside US to evaluate baby/FHR, rhogam  - UA to r/o asymptomatic bacteruria  - Syncope, likely vagal- EKG to r/o abnormal intervals/arrhythmia      ED Course         Critical Care Time  Procedures

## 2023-12-29 NOTE — ED PROVIDER NOTES
History  Chief Complaint   Patient presents with    Diarrhea     Pt c/o diarrhea and abdominal pain, feeling weak. Reports that she is 15 weeks pregnant.      NÉSTOR Chang is a 28 y.o. female  15w4d who presents to the emergency department with vomiting and diarrhea.  She states for the past 5 days she has had diarrhea approximately twice a day and vomiting in the morning and with eating.  Over this timeframe she has also had cramping in her abdomen.  Today she had an episode of vomiting and passed out, falling to the ground hitting her abdomen.  She denied chest pain or shortness of breath.  She states in the past she has had a syncopal episode after vomiting.  She denies personal or family history of heart disease.  She denies any change to the abdominal pain since the fall. she states she had a small amount of vaginal bleeding 2 days ago.  She denies any vaginal bleeding, fluid leakage, discharge, or dysuria today.     Prior to Admission Medications   Prescriptions Last Dose Informant Patient Reported? Taking?   albuterol (PROVENTIL HFA,VENTOLIN HFA) 90 mcg/act inhaler  Self No No   Sig: Inhale 2 puffs every 6 (six) hours as needed for wheezing or shortness of breath   Patient not taking: Reported on 2023   fluticasone (FLOVENT HFA) 110 MCG/ACT inhaler  Self No No   Sig: Inhale 2 puffs 2 (two) times a day Rinse mouth after use.   Patient not taking: Reported on 2023   ketoconazole (NIZORAL) 2 % shampoo   No No   Sig: APPLY 1 APPLICATION TOPICALLY 2 (TWO) TIMES A WEEK   Patient not taking: Reported on 2023   ondansetron (Zofran ODT) 4 mg disintegrating tablet   No No   Sig: Take 1 tablet (4 mg total) by mouth every 6 (six) hours as needed for nausea or vomiting   Patient not taking: Reported on 2023   pantoprazole (PROTONIX) 40 mg tablet   Yes No   Sig: Take 40 mg by mouth daily   Patient not taking: Reported on 10/24/2023   sucralfate (CARAFATE) 1 g tablet   No No   Sig: Take 1  tablet (1 g total) by mouth 4 (four) times a day   Patient not taking: Reported on 8/31/2023   triamcinolone (KENALOG) 0.5 % cream   No No   Sig: Apply topically 3 (three) times a day   Patient not taking: Reported on 10/24/2023      Facility-Administered Medications: None       Past Medical History:   Diagnosis Date    Asthma     in early childhood    Chlamydia 2020    Fatty liver     H. pylori infection     2020, 2021    Prediabetes        Past Surgical History:   Procedure Laterality Date    APPENDECTOMY      OVARIAN CYST REMOVAL Right 11/20/2019       Family History   Problem Relation Age of Onset    No Known Problems Mother     No Known Problems Father     No Known Problems Sister     No Known Problems Sister     Diabetes Maternal Grandmother     No Known Problems Maternal Grandfather     No Known Problems Paternal Grandmother     No Known Problems Paternal Grandfather     Breast cancer Neg Hx     Cancer Neg Hx     Colon cancer Neg Hx     Ovarian cancer Neg Hx      I have reviewed and agree with the history as documented.    E-Cigarette/Vaping    E-Cigarette Use Never User      E-Cigarette/Vaping Substances     Social History     Tobacco Use    Smoking status: Never    Smokeless tobacco: Never   Vaping Use    Vaping status: Never Used   Substance Use Topics    Alcohol use: Not Currently     Comment: couple times/year    Drug use: Never       Home medications:  Prior to Admission Medications   Prescriptions Last Dose Informant Patient Reported? Taking?   albuterol (PROVENTIL HFA,VENTOLIN HFA) 90 mcg/act inhaler  Self No No   Sig: Inhale 2 puffs every 6 (six) hours as needed for wheezing or shortness of breath   Patient not taking: Reported on 11/1/2023   fluticasone (FLOVENT HFA) 110 MCG/ACT inhaler  Self No No   Sig: Inhale 2 puffs 2 (two) times a day Rinse mouth after use.   Patient not taking: Reported on 8/31/2023   ketoconazole (NIZORAL) 2 % shampoo   No No   Sig: APPLY 1 APPLICATION TOPICALLY 2 (TWO) TIMES  A WEEK   Patient not taking: Reported on 11/1/2023   ondansetron (Zofran ODT) 4 mg disintegrating tablet   No No   Sig: Take 1 tablet (4 mg total) by mouth every 6 (six) hours as needed for nausea or vomiting   Patient not taking: Reported on 11/1/2023   pantoprazole (PROTONIX) 40 mg tablet   Yes No   Sig: Take 40 mg by mouth daily   Patient not taking: Reported on 10/24/2023   sucralfate (CARAFATE) 1 g tablet   No No   Sig: Take 1 tablet (1 g total) by mouth 4 (four) times a day   Patient not taking: Reported on 8/31/2023   triamcinolone (KENALOG) 0.5 % cream   No No   Sig: Apply topically 3 (three) times a day   Patient not taking: Reported on 10/24/2023      Facility-Administered Medications: None     Allergies:  No Known Allergies     Review of Systems   Constitutional:  Negative for fever.   Respiratory:  Negative for cough and shortness of breath.    Cardiovascular:  Negative for chest pain.   Gastrointestinal:  Positive for abdominal pain, diarrhea and vomiting. Negative for blood in stool.   Genitourinary:  Negative for dysuria.   All other systems reviewed and are negative.      Physical Exam  ED Triage Vitals   Temperature Pulse Respirations Blood Pressure SpO2   12/29/23 1510 12/29/23 1510 12/29/23 1510 12/29/23 1510 12/29/23 1510   98.4 °F (36.9 °C) 89 18 119/65 100 %      Temp Source Heart Rate Source Patient Position - Orthostatic VS BP Location FiO2 (%)   12/29/23 1510 12/29/23 1510 12/29/23 1918 12/29/23 1918 --   Oral Monitor Sitting Left arm       Pain Score       12/29/23 1510       9             Orthostatic Vital Signs  Vitals:    12/29/23 1510 12/29/23 1918   BP: 119/65 111/72   Pulse: 89 93   Patient Position - Orthostatic VS:  Sitting       Physical Exam  Vitals and nursing note reviewed.   Constitutional:       General: She is not in acute distress.     Appearance: She is not ill-appearing or diaphoretic.   HENT:      Head: Normocephalic and atraumatic.      Mouth/Throat:      Mouth: Mucous  membranes are moist.   Eyes:      Pupils: Pupils are equal, round, and reactive to light.   Cardiovascular:      Rate and Rhythm: Normal rate and regular rhythm.      Heart sounds: No murmur heard.  Pulmonary:      Effort: Pulmonary effort is normal. No respiratory distress.      Breath sounds: Normal breath sounds. No wheezing, rhonchi or rales.   Abdominal:      General: Abdomen is flat. There is no distension.      Palpations: Abdomen is soft.      Tenderness: There is no right CVA tenderness, left CVA tenderness, guarding or rebound.      Comments: Mild discomfort to palpation   Musculoskeletal:      Cervical back: No tenderness.   Skin:     General: Skin is warm and dry.   Neurological:      Mental Status: She is alert.         ED Medications  Medications   Rho(D) immune globulin (RHOGAM ULTRA-FILTERED PLUS) IM injection 300 mcg (300 mcg Intramuscular Not Given 12/29/23 2126)   sodium chloride 0.9 % bolus 1,000 mL (0 mL Intravenous Stopped 12/29/23 1919)   ondansetron (ZOFRAN) injection 4 mg (4 mg Intravenous Given 12/29/23 1709)   acetaminophen (TYLENOL) tablet 975 mg (975 mg Oral Given 12/29/23 1709)       Diagnostic Studies  Results Reviewed       Procedure Component Value Units Date/Time    UA w Reflex to Microscopic w Reflex to Culture [[id number]]  (Abnormal) Collected: 12/29/23 1925    Lab Status: Final result Specimen: Urine, Clean Catch Updated: 12/29/23 1940     Color, UA Light Yellow     Clarity, UA Clear     Specific Gravity, UA 1.018     pH, UA 6.0     Leukocytes, UA Negative     Nitrite, UA Negative     Protein, UA Negative mg/dl      Glucose, UA Negative mg/dl      Ketones,  (4+) mg/dl      Urobilinogen, UA <2.0 mg/dl      Bilirubin, UA Negative     Occult Blood, UA Negative     URINE COMMENT --    Urine culture [[id number]] Collected: 12/29/23 1925    Lab Status: In process Specimen: Urine, Clean Catch Updated: 12/29/23 1940    Comprehensive metabolic panel [[id number]]  (Abnormal) Collected:  12/29/23 1709    Lab Status: Final result Specimen: Blood from Arm, Right Updated: 12/29/23 1742     Sodium 134 mmol/L      Potassium 3.4 mmol/L      Chloride 104 mmol/L      CO2 22 mmol/L      ANION GAP 8 mmol/L      BUN 6 mg/dL      Creatinine 0.45 mg/dL      Glucose 75 mg/dL      Calcium 9.3 mg/dL      AST 17 U/L      ALT 10 U/L      Alkaline Phosphatase 29 U/L      Total Protein 6.7 g/dL      Albumin 4.0 g/dL      Total Bilirubin 0.29 mg/dL      eGFR 138 ml/min/1.73sq m     Narrative:      [employer] guidelines for Chronic Kidney Disease (CKD):     Stage 1 with normal or high GFR (GFR > 90 mL/min/1.73 square meters)    Stage 2 Mild CKD (GFR = 60-89 mL/min/1.73 square meters)    Stage 3A Moderate CKD (GFR = 45-59 mL/min/1.73 square meters)    Stage 3B Moderate CKD (GFR = 30-44 mL/min/1.73 square meters)    Stage 4 Severe CKD (GFR = 15-29 mL/min/1.73 square meters)    Stage 5 End Stage CKD (GFR <15 mL/min/1.73 square meters)  Note: GFR calculation is accurate only with a steady state creatinine    Lipase [[id number]]  (Normal) Collected: 12/29/23 1709    Lab Status: Final result Specimen: Blood from Arm, Right Updated: 12/29/23 1742     Lipase 66 u/L     CBC and differential [[id number]]  (Abnormal) Collected: 12/29/23 1709    Lab Status: Final result Specimen: Blood from Arm, Right Updated: 12/29/23 1723     WBC 9.86 Thousand/uL      RBC 4.04 Million/uL      Hemoglobin 11.2 g/dL      Hematocrit 32.9 %      MCV 81 fL      MCH 27.7 pg      MCHC 34.0 g/dL      RDW 12.5 %      MPV 10.0 fL      Platelets 255 Thousands/uL      nRBC 0 /100 WBCs      Neutrophils Relative 72 %      Immat GRANS % 1 %      Lymphocytes Relative 22 %      Monocytes Relative 5 %      Eosinophils Relative 0 %      Basophils Relative 0 %      Neutrophils Absolute 7.09 Thousands/µL      Immature Grans Absolute 0.06 Thousand/uL      Lymphocytes Absolute 2.14 Thousands/µL      Monocytes Absolute 0.51 Thousand/µL       Eosinophils Absolute 0.04 Thousand/µL      Basophils Absolute 0.02 Thousands/µL                    No orders to display         Procedures  POC Pelvic US    Date/Time: 12/29/2023 5:06 PM    Performed by: Kaiden Sheridan MD  Authorized by: Kaiden Sheridan MD    Patient location:  ED  Procedure details:     Exam Type:  Diagnostic    Indications: pregnant with abdominal pain      Assessment for: evaluate fetal viability      Technique:  Transabdominal obstetric (HCG+) exam    Views obtained: uterus (transverse and sagittal)      Image quality: diagnostic      Image availability:  Images available in PACS  Uterine findings:     Intrauterine pregnancy: identified      Fetal pole: identified      Fetal heart rate: identified      Fetal heart rate (bpm):  157  Interpretation:     Ultrasound impressions: normal      Pregnancy findings: intrauterine pregnancy (IUP)    Comments:      Fetus and placenta visualized, fetal movement observed.  No bleeding visualized in uterus.        ED Course                             SBIRT 20yo+      Flowsheet Row Most Recent Value   Initial Alcohol Screen: US AUDIT-C     1. How often do you have a drink containing alcohol? 0 Filed at: 12/29/2023 1927   2. How many drinks containing alcohol do you have on a typical day you are drinking?  0 Filed at: 12/29/2023 1927   3a. Male UNDER 65: How often do you have five or more drinks on one occasion? 0 Filed at: 12/29/2023 1927   3b. FEMALE Any Age, or MALE 65+: How often do you have 4 or more drinks on one occassion? 0 Filed at: 12/29/2023 1927   Audit-C Score 0 Filed at: 12/29/2023 1927   NINO: How many times in the past year have you...    Used an illegal drug or used a prescription medication for non-medical reasons? Never Filed at: 12/29/2023 1927                  [facility]  Medical Decision Making  Amount and/or Complexity of Data Reviewed  Labs: ordered.    Risk  OTC drugs.  Prescription drug management.      Pascale Chang  "is a 28 y.o. female  15w4d who presents to the emergency department with vomiting, diarrhea, and fall. Workup including vital signs, physical exam, EKG, labs, urinalysis, and US.  EKG without arrhythmia or acute ischemic changes, labs largely unremarkable, UA without signs of infection.  Bedside ultrasound with fetal heart rate within normal range and fetal movement observed, no bleeding visualized in uterus.  Syncopal episode likely vagal in setting of vomiting. Treatment including IVF and Zofran with significant improvement in symptoms, patient now eating a sandwich and drinking fluids in the ER, requesting discharge.  Per chart review patient Rh-.  Given vaginal bleeding 2 days ago and abdominal trauma today, recommended administration of RhoGAM.  Had extensive discussion with patient and mother using  to explain the indication for RhoGAM in preventing immune response to pregnancies.  Patient adamantly refusing RhoGAM, stating that she does not like to \"put medications into my body.\"  Explained to patient very explicitly that not taking the RhoGAM today puts her at risk for complications up to and including death of future fetuses and that patient may never have a healthy pregnancy in the future if she were to have an alloimmunization.  Explained to patient that this could be prevented by administering RhoGAM today. Explained to patient the time sensitivity of receiving RhoGAM and that it would not be effective if she waited until a later date. patient states that she clearly understands these risks however she is insistent that she does not want to receive RhoGAM. Stable for discharge home with OB follow up, discharge instructions and return precautions given.       Disposition  Final diagnoses:   Pregnancy   Abdominal pain   Syncope     Time reflects when diagnosis was documented in both MDM as applicable and the Disposition within this note       Time User Action Codes Description " "Comment    12/29/2023  9:06 PM PankajharjitdreabrayanKaiden gant Add [Z34.90] Pregnancy     12/29/2023  9:06 PM PankajmaryanaKaiden gant Add [R10.9] Abdominal pain     12/29/2023  9:06 PM ChungbrayanKaiden gant Shannon [R55] Syncope           ED Disposition       ED Disposition   Discharge    Condition   Stable    Date/Time   Fri Dec 29, 2023 2109    Comment   Pascale Chang discharge to home/self care.                   Follow-up Information    None         Discharge Medication List as of 12/29/2023  9:09 PM        CONTINUE these medications which have NOT CHANGED    Details   albuterol (PROVENTIL HFA,VENTOLIN HFA) 90 mcg/act inhaler Inhale 2 puffs every 6 (six) hours as needed for wheezing or shortness of breath, Starting Fri 8/6/2021, Normal      fluticasone (FLOVENT HFA) 110 MCG/ACT inhaler Inhale 2 puffs 2 (two) times a day Rinse mouth after use., Starting Fri 8/6/2021, Normal      ketoconazole (NIZORAL) 2 % shampoo APPLY 1 APPLICATION TOPICALLY 2 (TWO) TIMES A WEEK, Starting Thu 10/5/2023, Normal      ondansetron (Zofran ODT) 4 mg disintegrating tablet Take 1 tablet (4 mg total) by mouth every 6 (six) hours as needed for nausea or vomiting, Starting Wed 9/13/2023, Normal      pantoprazole (PROTONIX) 40 mg tablet Take 40 mg by mouth daily, Starting Wed 2/22/2023, Until Thu 2/22/2024, Historical Med      sucralfate (CARAFATE) 1 g tablet Take 1 tablet (1 g total) by mouth 4 (four) times a day, Starting Thu 12/8/2022, Normal      triamcinolone (KENALOG) 0.5 % cream Apply topically 3 (three) times a day, Starting Thu 8/31/2023, Normal             No discharge procedures on file.    PDMP Review       None             ED Provider  Attending physically available and evaluated Pascale Chang. I managed the patient along with the ED Attending.    Electronically Signed by    Portions of the record may have been created with voice recognition software.  Occasional wrong word or \"sound a like\" substitutions may have occurred due to the " inherent limitations of voice recognition software.  Read the chart carefully and recognize, using context, where substitutions have occurred       Kaiden Sheridan MD  12/29/23 0984

## 2023-12-30 LAB
ATRIAL RATE: 83 BPM
BACTERIA UR CULT: ABNORMAL
BACTERIA UR CULT: ABNORMAL
P AXIS: 30 DEGREES
PR INTERVAL: 146 MS
QRS AXIS: 70 DEGREES
QRSD INTERVAL: 92 MS
QT INTERVAL: 392 MS
QTC INTERVAL: 460 MS
T WAVE AXIS: 22 DEGREES
VENTRICULAR RATE: 83 BPM

## 2023-12-30 NOTE — DISCHARGE INSTRUCTIONS
You were offered a medication called RhoGAM to prevent complications in future pregnancies up to and including death of future fetuses.  You expressed understanding of these risks and declined this medication.  Follow-up with your OB/GYN.    Return to the ER if symptoms worsen or if you have any other concerns.

## 2024-01-12 DIAGNOSIS — R11.2 NAUSEA AND VOMITING: ICD-10-CM

## 2024-01-12 RX ORDER — ONDANSETRON 4 MG/1
4 TABLET, ORALLY DISINTEGRATING ORAL EVERY 6 HOURS PRN
Qty: 20 TABLET | Refills: 1 | Status: SHIPPED | OUTPATIENT
Start: 2024-01-12

## 2024-02-11 ENCOUNTER — HOSPITAL ENCOUNTER (EMERGENCY)
Facility: HOSPITAL | Age: 29
Discharge: HOME | End: 2024-02-11
Attending: EMERGENCY MEDICINE
Payer: COMMERCIAL

## 2024-02-11 VITALS
RESPIRATION RATE: 16 BRPM | OXYGEN SATURATION: 98 % | DIASTOLIC BLOOD PRESSURE: 66 MMHG | SYSTOLIC BLOOD PRESSURE: 114 MMHG | TEMPERATURE: 98 F | HEART RATE: 98 BPM

## 2024-02-11 DIAGNOSIS — R30.0 DYSURIA: Primary | ICD-10-CM

## 2024-02-11 DIAGNOSIS — N89.8 VAGINAL IRRITATION: ICD-10-CM

## 2024-02-11 LAB
AMORPH URATE CRY URNS QL MICRO: ABNORMAL
BACTERIA UR QL AUTO: ABNORMAL /HPF
BILIRUB UR QL STRIP: NEGATIVE
CLARITY UR: ABNORMAL
COLOR UR: YELLOW
GLUCOSE UR STRIP-MCNC: NEGATIVE MG/DL
HGB UR QL STRIP.AUTO: NEGATIVE
KETONES UR STRIP-MCNC: NEGATIVE MG/DL
LEUKOCYTE ESTERASE UR QL STRIP: ABNORMAL
MUCOUS THREADS UR QL AUTO: ABNORMAL
NITRITE UR QL STRIP: NEGATIVE
NON-SQ EPI CELLS URNS QL MICRO: ABNORMAL /HPF
PH UR STRIP.AUTO: 7 [PH] (ref 4.5–8)
PROT UR STRIP-MCNC: NEGATIVE MG/DL
RBC #/AREA URNS AUTO: ABNORMAL /HPF
SP GR UR STRIP.AUTO: 1.02 (ref 1–1.03)
UROBILINOGEN UR QL STRIP.AUTO: 0.2 E.U./DL
WBC #/AREA URNS AUTO: ABNORMAL /HPF

## 2024-02-11 PROCEDURE — 81514 NFCT DS BV&VAGINITIS DNA ALG: CPT | Performed by: PHYSICIAN ASSISTANT

## 2024-02-11 PROCEDURE — 76815 OB US LIMITED FETUS(S): CPT | Performed by: PHYSICIAN ASSISTANT

## 2024-02-11 PROCEDURE — 99283 EMERGENCY DEPT VISIT LOW MDM: CPT

## 2024-02-11 PROCEDURE — 87086 URINE CULTURE/COLONY COUNT: CPT

## 2024-02-11 PROCEDURE — 81001 URINALYSIS AUTO W/SCOPE: CPT

## 2024-02-11 PROCEDURE — 99284 EMERGENCY DEPT VISIT MOD MDM: CPT | Performed by: PHYSICIAN ASSISTANT

## 2024-02-11 RX ORDER — CEPHALEXIN 500 MG/1
500 CAPSULE ORAL EVERY 6 HOURS SCHEDULED
Qty: 28 CAPSULE | Refills: 0 | Status: SHIPPED | OUTPATIENT
Start: 2024-02-11 | End: 2024-02-18

## 2024-02-11 RX ORDER — CEPHALEXIN 250 MG/1
500 CAPSULE ORAL ONCE
Status: COMPLETED | OUTPATIENT
Start: 2024-02-11 | End: 2024-02-11

## 2024-02-11 RX ADMIN — CEPHALEXIN 500 MG: 250 CAPSULE ORAL at 20:25

## 2024-02-12 LAB
C GLABRATA DNA VAG QL NAA+PROBE: POSITIVE
C KRUSEI DNA VAG QL NAA+PROBE: NEGATIVE
CANDIDA SP 6 PNL VAG NAA+PROBE: POSITIVE
T VAGINALIS DNA VAG QL NAA+PROBE: NEGATIVE
VAGINOSIS/ITIS DNA PNL VAG PROBE+SIG AMP: NEGATIVE

## 2024-02-12 RX ORDER — CLOTRIMAZOLE 1 %
1 CREAM WITH APPLICATOR VAGINAL
Qty: 45 G | Refills: 0 | Status: SHIPPED | OUTPATIENT
Start: 2024-02-12 | End: 2024-02-19

## 2024-02-12 NOTE — ED NOTES
Per OB, pt to be seen down in ED. Would like fetal heart tones to be obtained. Will make primary RN aware.     Maureen Garcia  02/11/24 1925

## 2024-02-12 NOTE — ED PROVIDER NOTES
History  Chief Complaint   Patient presents with    Possible UTI     Pt reports painful urination with burning for 2 days. Also reports vaginal burning while taking a bath. Denies abdominal pain, back pain, vaginal bleeding, cramping. Currently 21 weeks pregnant.     This is a 28-year-old female who is approximately 21 weeks gestation presenting to ED for evaluation of burning with urination.  Patient states for the past 2 days she has noted burning with urination.  She states burning sensation comes from the outer vagina.  She states that she is out of the bathtub today and had a burning sensation to the outer vagina.  She denies any pain inside the vagina.  She denies any increase in vaginal discharge or malodorous discharge, vaginal bleeding, or loss of fluids.  She does feel baby kick.  She endorses some mild suprapubic tenderness, denies any nausea, vomiting or diarrhea.  She denies any back pain or flank pain, she denies any fevers or chills.  She states she notes some vaginal irritation but denies any obvious lumps or lesions.      History provided by:  Patient   used: No        Prior to Admission Medications   Prescriptions Last Dose Informant Patient Reported? Taking?   albuterol (PROVENTIL HFA,VENTOLIN HFA) 90 mcg/act inhaler  Self No No   Sig: Inhale 2 puffs every 6 (six) hours as needed for wheezing or shortness of breath   Patient not taking: Reported on 11/1/2023   fluticasone (FLOVENT HFA) 110 MCG/ACT inhaler  Self No No   Sig: Inhale 2 puffs 2 (two) times a day Rinse mouth after use.   Patient not taking: Reported on 8/31/2023   ketoconazole (NIZORAL) 2 % shampoo   No No   Sig: APPLY 1 APPLICATION TOPICALLY 2 (TWO) TIMES A WEEK   Patient not taking: Reported on 11/1/2023   ondansetron (ZOFRAN-ODT) 4 mg disintegrating tablet   No No   Sig: TAKE 1 TABLET (4 MG TOTAL) BY MOUTH EVERY 6 (SIX) HOURS AS NEEDED FOR NAUSEA OR VOMITING   pantoprazole (PROTONIX) 40 mg tablet   Yes No   Sig:  Take 40 mg by mouth daily   Patient not taking: Reported on 10/24/2023   sucralfate (CARAFATE) 1 g tablet   No No   Sig: Take 1 tablet (1 g total) by mouth 4 (four) times a day   Patient not taking: Reported on 8/31/2023   triamcinolone (KENALOG) 0.5 % cream   No No   Sig: Apply topically 3 (three) times a day   Patient not taking: Reported on 10/24/2023      Facility-Administered Medications: None       Past Medical History:   Diagnosis Date    Asthma     in early childhood    Chlamydia 2020    Fatty liver     H. pylori infection     2020, 2021    Prediabetes        Past Surgical History:   Procedure Laterality Date    APPENDECTOMY      OVARIAN CYST REMOVAL Right 11/20/2019       Family History   Problem Relation Age of Onset    No Known Problems Mother     No Known Problems Father     No Known Problems Sister     No Known Problems Sister     Diabetes Maternal Grandmother     No Known Problems Maternal Grandfather     No Known Problems Paternal Grandmother     No Known Problems Paternal Grandfather     Breast cancer Neg Hx     Cancer Neg Hx     Colon cancer Neg Hx     Ovarian cancer Neg Hx      I have reviewed and agree with the history as documented.    E-Cigarette/Vaping    E-Cigarette Use Never User      E-Cigarette/Vaping Substances    Nicotine No     THC No     CBD No     Flavoring No     Other No     Unknown No      Social History     Tobacco Use    Smoking status: Never    Smokeless tobacco: Never   Vaping Use    Vaping status: Never Used   Substance Use Topics    Alcohol use: Not Currently     Comment: couple times/year    Drug use: Never       Review of Systems   Gastrointestinal:  Negative for abdominal pain, nausea and vomiting.   Genitourinary:  Positive for dysuria. Negative for flank pain, frequency, hematuria, urgency, vaginal bleeding and vaginal discharge.   Musculoskeletal:  Negative for back pain.   All other systems reviewed and are negative.      Physical Exam  Physical Exam  Vitals reviewed.  Exam conducted with a chaperone present (HALEIGH Benavidez).   Constitutional:       General: She is not in acute distress.     Appearance: Normal appearance. She is well-developed and well-groomed. She is not ill-appearing.   HENT:      Head: Normocephalic and atraumatic.      Right Ear: External ear normal.      Left Ear: External ear normal.      Nose: Nose normal.   Eyes:      General: No scleral icterus.     Conjunctiva/sclera: Conjunctivae normal.   Cardiovascular:      Rate and Rhythm: Normal rate and regular rhythm.   Pulmonary:      Effort: Pulmonary effort is normal.      Breath sounds: No stridor.   Abdominal:      General: There is no distension.      Tenderness: There is no abdominal tenderness. There is no right CVA tenderness, left CVA tenderness, guarding or rebound.      Comments: Abdomen soft, NTND.  Gravid abdomen.   Genitourinary:     Labia:         Right: No rash, tenderness or lesion.         Left: No rash, tenderness or lesion.           Comments: Tenderness noted to a small area on the left labia after, no obvious lesion in this area.  No lesions or ulcerations noted to the external genitalia.  No discharge noted from the vaginal opening.  Musculoskeletal:         General: No deformity. Normal range of motion.      Cervical back: Normal range of motion.   Skin:     Coloration: Skin is not jaundiced or pale.      Findings: No lesion or rash.   Neurological:      Mental Status: She is alert and oriented to person, place, and time.   Psychiatric:         Mood and Affect: Mood normal.         Behavior: Behavior normal. Behavior is cooperative.         Vital Signs  ED Triage Vitals   Temperature Pulse Respirations Blood Pressure SpO2   02/11/24 1920 02/11/24 1920 02/11/24 1920 02/11/24 1920 02/11/24 1920   98 °F (36.7 °C) (!) 110 16 123/69 100 %      Temp Source Heart Rate Source Patient Position - Orthostatic VS BP Location FiO2 (%)   02/11/24 1920 02/11/24 2023 02/11/24 2023 02/11/24 2023 --   Oral  Monitor Lying Right arm       Pain Score       --                  Vitals:    02/11/24 1920 02/11/24 2023   BP: 123/69 114/66   Pulse: (!) 110 98   Patient Position - Orthostatic VS:  Lying         Visual Acuity      ED Medications  Medications   cephalexin (KEFLEX) capsule 500 mg (500 mg Oral Given 2/11/24 2025)       Diagnostic Studies  Results Reviewed       Procedure Component Value Units Date/Time    Molecular Vaginal Panel [[id number]] Collected: 02/11/24 2008    Lab Status: In process Specimen: Genital from Vaginal Updated: 02/11/24 2014    Urine Microscopic [[id number]]  (Abnormal) Collected: 02/11/24 1938    Lab Status: Final result Specimen: Urine, Clean Catch Updated: 02/11/24 1951     RBC, UA 1-2 /hpf      WBC, UA 10-20 /hpf      Epithelial Cells Moderate /hpf      Bacteria, UA Occasional /hpf      MUCUS THREADS Occasional     Amorphous Crystals, UA Occasional    Urine culture [[id number]] Collected: 02/11/24 1938    Lab Status: In process Specimen: Urine, Clean Catch Updated: 02/11/24 1944    Urine Macroscopic, POC [[id number]]  (Abnormal) Collected: 02/11/24 1938    Lab Status: Final result Specimen: Urine Updated: 02/11/24 1940     Color, UA Yellow     Clarity, UA Turbid     pH, UA 7.0     Leukocytes, UA Small     Nitrite, UA Negative     Protein, UA Negative mg/dl      Glucose, UA Negative mg/dl      Ketones, UA Negative mg/dl      Urobilinogen, UA 0.2 E.U./dl      Bilirubin, UA Negative     Occult Blood, UA Negative     Specific Gravity, UA 1.025    Narrative:      CLINITEK RESULT                   No orders to display              Procedures  POC Pelvic US    Date/Time: 2/11/2024 8:20 PM    Performed by: Ada Victor PA-C  Authorized by: Ada Victor PA-C    Patient location:  ED  Procedure details:     Exam Type:  Diagnostic    Indications: evaluate for IUP and pregnant with abdominal pain      Assessment for: confirm intrauterine pregnancy and evaluate fetal viability      Technique:   Transabdominal obstetric (HCG+) exam    Image quality: diagnostic      Image availability:  Still images obtained  Uterine findings:     Single gestation: identified      Fetal pole: identified      Fetal heart rate: identified      Fetal heart rate (bpm):  148  Interpretation:     Ultrasound impressions: normal      Pregnancy findings: intrauterine pregnancy (IUP)             ED Course  ED Course as of 02/11/24 2150   Sun Feb 11, 2024 1941 Leukocytes, UA(!): Small   1951 WBC, UA(!): 10-20   1951 Bacteria, UA: Occasional                                             Medical Decision Making      Differential diagnosis including but not limited to: UTI, urethritis, BV, yeast; doubt acute intraabdominal surgical process.     Patient presenting to ED for evaluation of 2 days of dysuria and vaginal irritation.  UA with small leuks, 10-20 WBCs and occasional bacteria.  Will treat for UTI given patient is currently 21 weeks gestation.  On external  exam, no obvious lesions or irritation.  No ulcerative lesions to suggest herpetic disease.  Patient did have an area of tenderness on the left labia, no obvious lesion in this area.  Recommend patient follow-up with OB/GYN this week for reevaluation.    Prior to discharge, discharge instructions were discussed with patient at bedside. Patient was provided both verbal and written instructions. Patient is understanding of the discharge instructions and is agreeable to plan of care. Return precautions were discussed with patient bedside, patient verbalized understanding of signs and symptoms that would necessitate return to the ED. All questions were answered. Patient was comfortable with the plan of care and discharged to home.     Dispo: discharge home with follow up to OBGYN. Patient stable, in no acute distress and non-toxic at discharge.     Problems Addressed:  Dysuria: acute illness or injury  Vaginal irritation: acute illness or injury    Amount and/or Complexity of Data  Reviewed  Labs: ordered. Decision-making details documented in ED Course.    Risk  Prescription drug management.             Disposition  Final diagnoses:   Dysuria   Vaginal irritation     Time reflects when diagnosis was documented in both MDM as applicable and the Disposition within this note       Time User Action Codes Description Comment    2/11/2024  8:17 PM Ada Victor Add [R30.0] Dysuria     2/11/2024  8:17 PM Ada Victor Add [N89.8] Vaginal irritation           ED Disposition       ED Disposition   Discharge    Condition   Stable    Date/Time   Sun Feb 11, 2024  8:17 PM    Comment   Pascale Chang discharge to home/self care.                   Follow-up Information       Follow up With Specialties Details Why Contact Info    [facility] Ob[facility] Gynecology Schedule an appointment as soon as possible for a visit  As needed [address] [place] [id number]  [phone]              Discharge Medication List as of 2/11/2024  8:43 PM        START taking these medications    Details   cephalexin (KEFLEX) 500 mg capsule Take 1 capsule (500 mg total) by mouth every 6 (six) hours for 7 days, Starting Sun 2/11/2024, Until Sun 2/18/2024, Normal           CONTINUE these medications which have NOT CHANGED    Details   albuterol (PROVENTIL HFA,VENTOLIN HFA) 90 mcg/act inhaler Inhale 2 puffs every 6 (six) hours as needed for wheezing or shortness of breath, Starting Fri 8/6/2021, Normal      fluticasone (FLOVENT HFA) 110 MCG/ACT inhaler Inhale 2 puffs 2 (two) times a day Rinse mouth after use., Starting Fri 8/6/2021, Normal      ketoconazole (NIZORAL) 2 % shampoo APPLY 1 APPLICATION TOPICALLY 2 (TWO) TIMES A WEEK, Starting Thu 10/5/2023, Normal      ondansetron (ZOFRAN-ODT) 4 mg disintegrating tablet TAKE 1 TABLET (4 MG TOTAL) BY MOUTH EVERY 6 (SIX) HOURS AS NEEDED FOR NAUSEA OR VOMITING, Starting Fri 1/12/2024, Normal      pantoprazole (PROTONIX) 40 mg tablet Take 40 mg by mouth daily,  Starting Wed 2/22/2023, Until Thu 2/22/2024, Historical Med      sucralfate (CARAFATE) 1 g tablet Take 1 tablet (1 g total) by mouth 4 (four) times a day, Starting Thu 12/8/2022, Normal      triamcinolone (KENALOG) 0.5 % cream Apply topically 3 (three) times a day, Starting Thu 8/31/2023, Normal             No discharge procedures on file.    PDMP Review       None            ED Provider  Electronically Signed by Ada Victor PA-C  02/11/24 3458

## 2024-02-13 LAB — BACTERIA UR CULT: NORMAL

## 2024-06-18 ENCOUNTER — OFFICE VISIT (OUTPATIENT)
Dept: DENTISTRY | Facility: CLINIC | Age: 29
End: 2024-06-18

## 2024-06-18 VITALS — TEMPERATURE: 97.8 F

## 2024-06-18 DIAGNOSIS — K02.9 CARIES: Primary | ICD-10-CM

## 2024-06-18 DIAGNOSIS — K05.10 GINGIVITIS: ICD-10-CM

## 2024-06-18 PROCEDURE — D0210 INTRAORAL - COMPLETE SERIES OF RADIOGRAPHIC IMAGES: HCPCS | Performed by: DENTIST

## 2024-06-18 PROCEDURE — D0150 COMPREHENSIVE ORAL EVALUATION - NEW OR ESTABLISHED PATIENT: HCPCS | Performed by: DENTIST

## 2024-06-18 NOTE — DENTAL PROCEDURE DETAILS
"Comprehensive Oral Evaluation/Periodontal charting/FMX.   Pascale Chang presents for a Comprehensive exam. Verbal consent for treatment given in addition to the forms.  Consents signed: Yes  Reviewed health history - no changes.  Patient is ASA II  Pain Scale: 0  Chief complain: \"check up\".  Dental History: patient was seen by other provider in 1/18/2022. See note.   Perio: Gingivitis. See perio charting.   Caries Assessment: Medium  Radiographs: Complete mouth series     Oral Hygiene instruction reviewed and given.  Recommended Hygiene recall visits with the Pascale.    EOE: WNL.  IOE: WNL. Oral cancer screening is negative.     Treatment Plan:  1.  Infection control: referred for   2.  Periodontal therapy: adult prophy  3.  Caries control: as charted. Teeth #3 OL, #7 L, #10 L, #18 BP, #19 BP, #30 BP, #31 BP. Recommended restorations. Watch teeth #2,14,18,31 occlusals.   4.  Occlusal evaluation: Class I occlusion. Past history of ortho therapy and removal of wisdom teeth.   5.  Case Difficulty Type 1  Prognosis is Good.  Referrals needed: No  POI is given. Reviewed oral hygiene and need for recall visits.  Patient left satisfied and ambulatory.   NV1: Prophy with hyg.  NV2: Restorative care.     "

## 2024-06-19 ENCOUNTER — OFFICE VISIT (OUTPATIENT)
Dept: DENTISTRY | Facility: CLINIC | Age: 29
End: 2024-06-19

## 2024-06-19 VITALS — DIASTOLIC BLOOD PRESSURE: 75 MMHG | HEART RATE: 90 BPM | SYSTOLIC BLOOD PRESSURE: 109 MMHG

## 2024-06-19 DIAGNOSIS — Z01.20 ENCOUNTER FOR DENTAL EXAMINATION: Primary | ICD-10-CM

## 2024-06-19 PROCEDURE — D1110 PROPHYLAXIS - ADULT: HCPCS

## 2024-06-19 NOTE — DENTAL PROCEDURE DETAILS
Reviewed medical history   ASA II Prophylaxis completed with ultrasonic  and hand instrumentation.  Soft plaque removed and supragingival calculus removed from entire dentition.  Polished with prophy cup and paste.  Flossed and provided Oral Health Instructions.  Demonstrated proper brushing and flossing technique.  Patient left satisfied and ambulatory.    NV: 1 Restorative #3 OL   NV: 2 6MRC   No exam today

## 2024-06-20 ENCOUNTER — OFFICE VISIT (OUTPATIENT)
Dept: FAMILY MEDICINE CLINIC | Facility: CLINIC | Age: 29
End: 2024-06-20
Payer: COMMERCIAL

## 2024-06-20 VITALS
TEMPERATURE: 98.7 F | HEIGHT: 62 IN | DIASTOLIC BLOOD PRESSURE: 62 MMHG | SYSTOLIC BLOOD PRESSURE: 108 MMHG | RESPIRATION RATE: 16 BRPM | WEIGHT: 132 LBS | HEART RATE: 93 BPM | OXYGEN SATURATION: 95 % | BODY MASS INDEX: 24.29 KG/M2

## 2024-06-20 DIAGNOSIS — L30.9 DERMATITIS: Primary | ICD-10-CM

## 2024-06-20 PROCEDURE — 99213 OFFICE O/P EST LOW 20 MIN: CPT | Performed by: NURSE PRACTITIONER

## 2024-06-20 RX ORDER — OXYCODONE HYDROCHLORIDE 5 MG/1
5 TABLET ORAL EVERY 6 HOURS PRN
COMMUNITY
Start: 2024-06-08

## 2024-06-20 RX ORDER — TRIAMCINOLONE ACETONIDE 5 MG/G
CREAM TOPICAL 3 TIMES DAILY
Qty: 45 G | Refills: 0 | Status: SHIPPED | OUTPATIENT
Start: 2024-06-20

## 2024-06-20 RX ORDER — PANTOPRAZOLE SODIUM 20 MG/1
20 TABLET, DELAYED RELEASE ORAL DAILY
COMMUNITY
Start: 2024-03-22

## 2024-06-20 RX ORDER — FAMOTIDINE 20 MG/1
20 TABLET, FILM COATED ORAL 2 TIMES DAILY
COMMUNITY
Start: 2024-04-25 | End: 2025-04-25

## 2024-06-20 NOTE — PROGRESS NOTES
Ambulatory Visit  Name: Pascale Chang      : 1995      MRN: [id number]  Encounter Provider: VIRAJ Menezes  Encounter Date: 2024   Encounter department: [facility] [address] PRIMARY [facility]    Assessment & Plan   1. Dermatitis  Assessment & Plan:  - Prescription sent for triamcinolone cream.   - Contact office if no improvement.   Orders:  -     triamcinolone (KENALOG) 0.5 % cream; Apply topically 3 (three) times a day       History of Present Illness   {Disappearing Hyperlinks I Encounters * My Last Note * Since Last Visit * History :25565}  Patient presents to office today with complaints of rash on his abdomen and back. She just had a  on  and the rash started after that. She denies use of any new creams, lotions, soaps, detergents, or medications.         Review of Systems   Constitutional:  Negative for fatigue and fever.   HENT:  Negative for trouble swallowing.    Eyes:  Negative for visual disturbance.   Respiratory:  Negative for cough and shortness of breath.    Cardiovascular:  Negative for chest pain and palpitations.   Gastrointestinal:  Negative for abdominal pain and blood in stool.   Endocrine: Negative for cold intolerance and heat intolerance.   Genitourinary:  Negative for difficulty urinating and dysuria.   Musculoskeletal:  Negative for gait problem.   Skin:  Positive for rash.   Neurological:  Negative for dizziness, syncope and headaches.   Hematological:  Negative for adenopathy.   Psychiatric/Behavioral:  Negative for behavioral problems.      Past Medical History:   Diagnosis Date   • Asthma     in early childhood   • Chlamydia    • Dental caries    • Fatty liver    • H. pylori infection     2021   • Prediabetes      Past Surgical History:   Procedure Laterality Date   • APPENDECTOMY     • OVARIAN CYST REMOVAL Right 2019     Family History   Problem Relation Age of Onset   • No Known Problems Mother    • No Known Problems Father    • No  Known Problems Sister    • No Known Problems Sister    • Diabetes Maternal Grandmother    • No Known Problems Maternal Grandfather    • No Known Problems Paternal Grandmother    • No Known Problems Paternal Grandfather    • Breast cancer Neg Hx    • Cancer Neg Hx    • Colon cancer Neg Hx    • Ovarian cancer Neg Hx      Social History     Tobacco Use   • Smoking status: Never   • Smokeless tobacco: Never   Vaping Use   • Vaping status: Never Used   Substance and Sexual Activity   • Alcohol use: Not Currently     Comment: couple times/year   • Drug use: Never   • Sexual activity: Not Currently     Partners: Male     Birth control/protection: Condom Male     Current Outpatient Medications on File Prior to Visit   Medication Sig   • famotidine (PEPCID) 20 mg tablet Take 20 mg by mouth 2 (two) times a day   • Ferrous Sulfate (IRON PO) Take 1 tablet by mouth daily   • fluticasone (FLOVENT HFA) 110 MCG/ACT inhaler Inhale 2 puffs 2 (two) times a day Rinse mouth after use.   • ondansetron (ZOFRAN-ODT) 4 mg disintegrating tablet TAKE 1 TABLET (4 MG TOTAL) BY MOUTH EVERY 6 (SIX) HOURS AS NEEDED FOR NAUSEA OR VOMITING   • oxyCODONE (ROXICODONE) 5 immediate release tablet Take 5 mg by mouth every 6 (six) hours as needed   • pantoprazole (PROTONIX) 20 mg tablet Take 20 mg by mouth daily   • albuterol (PROVENTIL HFA,VENTOLIN HFA) 90 mcg/act inhaler Inhale 2 puffs every 6 (six) hours as needed for wheezing or shortness of breath (Patient not taking: Reported on 11/1/2023)   • ketoconazole (NIZORAL) 2 % shampoo APPLY 1 APPLICATION TOPICALLY 2 (TWO) TIMES A WEEK (Patient not taking: Reported on 11/1/2023)   • sucralfate (CARAFATE) 1 g tablet Take 1 tablet (1 g total) by mouth 4 (four) times a day (Patient not taking: Reported on 8/31/2023)   • [DISCONTINUED] omeprazole (PriLOSEC) 40 MG capsule Take 1 capsule (40 mg total) by mouth daily   • [DISCONTINUED] triamcinolone (KENALOG) 0.5 % cream Apply topically 3 (three) times a day  "(Patient not taking: Reported on 10/24/2023)     No Known Allergies  Immunization History   Administered Date(s) Administered   • COVID-19 PFIZER VACCINE 0.3 ML IM 05/13/2021, 06/03/2021   • DTaP 08/23/2012, 04/16/2013   • HPV9 08/11/2017, 10/11/2017, 01/14/2020   • Hep B, Adolescent or Pediatric 08/23/2012, 10/16/2012, 12/27/2012   • INFLUENZA 02/21/2019, 03/08/2022, 03/08/2022, 10/27/2022, 10/27/2022   • IPV 10/16/2012, 04/16/2013, 06/13/2013   • MMR 08/23/2012, 10/16/2012   • Meningococcal MCV4, Unspecified 08/23/2012, 09/26/2014   • Meningococcal MCV4P 08/23/2012, 09/26/2014   • Pneumococcal Conjugate Vaccine 20-valent (Pcv20), Polysace 02/22/2023   • Pneumococcal Polysaccharide PPV23 03/08/2022   • Td (adult), adsorbed 08/23/2012, 04/16/2013   • Tdap 10/16/2012, 09/26/2014, 02/21/2019, 05/08/2024   • Varicella 08/23/2012, 10/16/2012     Objective   {Disappearing Hyperlinks   Review Vitals * Enter New Vitals * Results Review * Labs * Imaging * Cardiology * Procedures * Lung Cancer Screening :46952}  /62 (BP Location: Left arm, Patient Position: Sitting, Cuff Size: Adult)   Pulse 93   Temp 98.7 °F (37.1 °C) (Tympanic)   Resp 16   Ht 5' 2\" (1.575 m)   Wt 59.9 kg (132 lb)   LMP 09/01/2023 (Exact Date)   SpO2 95%   BMI 24.14 kg/m²     Physical Exam  Vitals and nursing note reviewed.   Constitutional:       Appearance: Normal appearance. She is well-developed.   HENT:      Head: Normocephalic and atraumatic.      Right Ear: External ear normal.      Left Ear: External ear normal.   Eyes:      Conjunctiva/sclera: Conjunctivae normal.   Cardiovascular:      Rate and Rhythm: Normal rate and regular rhythm.      Heart sounds: Normal heart sounds.   Pulmonary:      Effort: Pulmonary effort is normal.      Breath sounds: Normal breath sounds.   Musculoskeletal:         General: Normal range of motion.      Cervical back: Normal range of motion.   Skin:     General: Skin is warm and dry.      Findings: Rash " present. Rash is macular and papular.   Neurological:      Mental Status: She is alert and oriented to person, place, and time.   Psychiatric:         Mood and Affect: Mood normal.         Behavior: Behavior normal.       Administrative Statements {Disappearing Hyperlinks I  [id number]}

## 2024-07-18 ENCOUNTER — OFFICE VISIT (OUTPATIENT)
Dept: DENTISTRY | Facility: CLINIC | Age: 29
End: 2024-07-18

## 2024-07-18 VITALS — DIASTOLIC BLOOD PRESSURE: 75 MMHG | HEART RATE: 74 BPM | TEMPERATURE: 98.4 F | SYSTOLIC BLOOD PRESSURE: 114 MMHG

## 2024-07-18 DIAGNOSIS — K02.9 DENTAL CARIES: Primary | ICD-10-CM

## 2024-07-18 PROCEDURE — D2391 RESIN-BASED COMPOSITE - 1 SURFACE, POSTERIOR: HCPCS

## 2024-07-18 PROCEDURE — D2392 RESIN-BASED COMPOSITE - 2 SURFACES, POSTERIOR: HCPCS

## 2024-07-18 NOTE — DENTAL PROCEDURE DETAILS
Composite Filling    Pascale Chang presents for composite filling. PMH reviewed, no changes.    Applied topical benzocaine, administered 1 carp 2% lido 1:100k epi via SEAN block    Prepped tooth #30 B, 31 B with 8835 diomedes on high speed.  Isolation with cotton rolls.    Etch with 37% H2PO4, rinse, dry. Applied Adhese with 20 second scrub once, gentle air dry and light cured for 10s. Restored with Tetric bulk edwardo shade A2 and light cured.    Refined with finishing burs, polished with enhance point. Verified occlusion and contacts.     An Rx for Prevident 5000 was sent to the patient pharmacy on file.    Pt left satisfied.    NV: #7 L, 10 L

## 2024-08-21 ENCOUNTER — OFFICE VISIT (OUTPATIENT)
Dept: FAMILY MEDICINE CLINIC | Facility: CLINIC | Age: 29
End: 2024-08-21
Payer: COMMERCIAL

## 2024-08-21 VITALS
HEIGHT: 62 IN | SYSTOLIC BLOOD PRESSURE: 122 MMHG | DIASTOLIC BLOOD PRESSURE: 82 MMHG | WEIGHT: 139 LBS | OXYGEN SATURATION: 99 % | BODY MASS INDEX: 25.58 KG/M2 | HEART RATE: 94 BPM | TEMPERATURE: 97.5 F | RESPIRATION RATE: 16 BRPM

## 2024-08-21 DIAGNOSIS — R42 DIZZINESS: Primary | ICD-10-CM

## 2024-08-21 PROCEDURE — 99213 OFFICE O/P EST LOW 20 MIN: CPT | Performed by: NURSE PRACTITIONER

## 2024-08-21 RX ORDER — MEDROXYPROGESTERONE ACETATE 150 MG/ML
150 INJECTION, SUSPENSION INTRAMUSCULAR
COMMUNITY
Start: 2024-08-25 | End: 2025-08-19

## 2024-08-21 NOTE — PROGRESS NOTES
Ambulatory Visit  Name: Pascale Chang      : 1995      MRN: [id number]  Encounter Provider: VIRAJ Menezes  Encounter Date: 2024   Encounter department: [facility] [address] [facility]    Assessment & Plan   1. Dizziness  Assessment & Plan:  - Will check labs.   - Advised to increase oral hydration.  - Will continue to follow up.  Orders:  -     CBC and differential; Future  -     Comprehensive metabolic panel; Future  -     TSH, 3rd generation with Free T4 reflex; Future  -     Iron Panel (Includes Ferritin, Iron Sat%, Iron, and TIBC); Future       History of Present Illness     Patient presents to office today with complaints of ear itching and dizziness. Denies any other symptoms. She does state that she is breast feeing so she is trying to drink a lot of water. Denies any other concerns or complaints today.          Review of Systems   Constitutional:  Negative for fatigue and fever.   HENT:  Positive for ear pain. Negative for trouble swallowing.    Eyes:  Negative for visual disturbance.   Respiratory:  Negative for cough and shortness of breath.    Cardiovascular:  Negative for chest pain and palpitations.   Gastrointestinal:  Negative for abdominal pain and blood in stool.   Endocrine: Negative for cold intolerance and heat intolerance.   Genitourinary:  Negative for difficulty urinating and dysuria.   Musculoskeletal:  Negative for gait problem.   Skin:  Negative for rash.   Neurological:  Positive for dizziness. Negative for syncope and headaches.   Hematological:  Negative for adenopathy.   Psychiatric/Behavioral:  Negative for behavioral problems.      Past Medical History:   Diagnosis Date   • Asthma     in early childhood   • Chlamydia    • Dental caries    • Fatty liver    • H. pylori infection     2021   • Prediabetes      Past Surgical History:   Procedure Laterality Date   • APPENDECTOMY     • OVARIAN CYST REMOVAL Right 2019     Family History    Problem Relation Age of Onset   • No Known Problems Mother    • No Known Problems Father    • No Known Problems Sister    • No Known Problems Sister    • Diabetes Maternal Grandmother    • No Known Problems Maternal Grandfather    • No Known Problems Paternal Grandmother    • No Known Problems Paternal Grandfather    • Breast cancer Neg Hx    • Cancer Neg Hx    • Colon cancer Neg Hx    • Ovarian cancer Neg Hx      Social History     Tobacco Use   • Smoking status: Never   • Smokeless tobacco: Never   Vaping Use   • Vaping status: Never Used   Substance and Sexual Activity   • Alcohol use: Not Currently     Comment: couple times/year   • Drug use: Never   • Sexual activity: Not Currently     Partners: Male     Birth control/protection: Condom Male     Current Outpatient Medications on File Prior to Visit   Medication Sig   • albuterol (PROVENTIL HFA,VENTOLIN HFA) 90 mcg/act inhaler Inhale 2 puffs every 6 (six) hours as needed for wheezing or shortness of breath   • famotidine (PEPCID) 20 mg tablet Take 20 mg by mouth 2 (two) times a day   • Ferrous Sulfate (IRON PO) Take 1 tablet by mouth daily   • fluticasone (FLOVENT HFA) 110 MCG/ACT inhaler Inhale 2 puffs 2 (two) times a day Rinse mouth after use.   • triamcinolone (KENALOG) 0.5 % cream Apply topically 3 (three) times a day   • ketoconazole (NIZORAL) 2 % shampoo APPLY 1 APPLICATION TOPICALLY 2 (TWO) TIMES A WEEK (Patient not taking: Reported on 11/1/2023)   • [START ON 8/25/2024] medroxyPROGESTERone (DEPO-PROVERA) 150 mg/mL injection Inject 150 mg into a muscle Do not start before August 25, 2024.   • ondansetron (ZOFRAN-ODT) 4 mg disintegrating tablet TAKE 1 TABLET (4 MG TOTAL) BY MOUTH EVERY 6 (SIX) HOURS AS NEEDED FOR NAUSEA OR VOMITING (Patient not taking: Reported on 8/21/2024)   • oxyCODONE (ROXICODONE) 5 immediate release tablet Take 5 mg by mouth every 6 (six) hours as needed (Patient not taking: Reported on 8/21/2024)   • pantoprazole (PROTONIX) 20 mg  "tablet Take 20 mg by mouth daily (Patient not taking: Reported on 8/21/2024)   • Sodium Fluoride 1.1 % PSTE Apply a thin ribbon of paste to a toothbrush. Brush thoroughly once daily for 2 minutes, preferably at bedtime in place of your regular toothpaste. After brushing, spit out what remains, do not rinse.   • sucralfate (CARAFATE) 1 g tablet Take 1 tablet (1 g total) by mouth 4 (four) times a day (Patient not taking: Reported on 8/31/2023)   • [DISCONTINUED] omeprazole (PriLOSEC) 40 MG capsule Take 1 capsule (40 mg total) by mouth daily     No Known Allergies  Immunization History   Administered Date(s) Administered   • COVID-19 PFIZER VACCINE 0.3 ML IM 05/13/2021, 06/03/2021   • DTaP 08/23/2012, 04/16/2013   • HPV9 08/11/2017, 10/11/2017, 01/14/2020   • Hep B, Adolescent or Pediatric 08/23/2012, 10/16/2012, 12/27/2012   • INFLUENZA 02/21/2019, 03/08/2022, 03/08/2022, 10/27/2022, 10/27/2022   • IPV 10/16/2012, 04/16/2013, 06/13/2013   • MMR 08/23/2012, 10/16/2012   • Meningococcal MCV4, Unspecified 08/23/2012, 09/26/2014   • Meningococcal MCV4P 08/23/2012, 09/26/2014   • Pneumococcal Conjugate Vaccine 20-valent (Pcv20), Polysace 02/22/2023   • Pneumococcal Polysaccharide PPV23 03/08/2022   • Td (adult), adsorbed 08/23/2012, 04/16/2013   • Tdap 10/16/2012, 09/26/2014, 02/21/2019, 05/08/2024   • Varicella 08/23/2012, 10/16/2012     Objective     /82 (BP Location: Left arm, Patient Position: Sitting, Cuff Size: Adult)   Pulse 94   Temp 97.5 °F (36.4 °C) (Tympanic)   Resp 16   Ht 5' 2\" (1.575 m)   Wt 63 kg (139 lb)   SpO2 99%   BMI 25.42 kg/m²     Physical Exam  Vitals and nursing note reviewed.   Constitutional:       Appearance: Normal appearance. She is well-developed.   HENT:      Head: Normocephalic and atraumatic.      Right Ear: Tympanic membrane, ear canal and external ear normal.      Left Ear: Tympanic membrane, ear canal and external ear normal.   Eyes:      Extraocular Movements: Extraocular " movements intact.      Conjunctiva/sclera: Conjunctivae normal.      Pupils: Pupils are equal, round, and reactive to light.   Cardiovascular:      Rate and Rhythm: Normal rate and regular rhythm.      Heart sounds: Normal heart sounds.   Pulmonary:      Effort: Pulmonary effort is normal.      Breath sounds: Normal breath sounds.   Musculoskeletal:         General: Normal range of motion.      Cervical back: Normal range of motion.   Skin:     General: Skin is warm and dry.   Neurological:      Mental Status: She is alert and oriented to person, place, and time.   Psychiatric:         Mood and Affect: Mood normal.         Behavior: Behavior normal.

## 2024-09-11 ENCOUNTER — TELEPHONE (OUTPATIENT)
Age: 29
End: 2024-09-11

## 2024-09-11 NOTE — TELEPHONE ENCOUNTER
Patient called. She has been experiencing dizzy spells, and passing out. She is concerned because she has a baby, and has been passing out while taking care of baby. She has been to other doctors who have told her there is nothing wrong, and she would like another opinion. Scheduled her next available, advised I would reach out to see if there are sooner appointments. Thank you.